# Patient Record
Sex: MALE | NOT HISPANIC OR LATINO | Employment: OTHER | URBAN - METROPOLITAN AREA
[De-identification: names, ages, dates, MRNs, and addresses within clinical notes are randomized per-mention and may not be internally consistent; named-entity substitution may affect disease eponyms.]

---

## 2018-01-17 ENCOUNTER — OFFICE VISIT CONVERTED (OUTPATIENT)
Dept: ORTHOPEDIC SURGERY | Facility: CLINIC | Age: 43
End: 2018-01-17
Attending: PHYSICIAN ASSISTANT

## 2018-03-14 ENCOUNTER — OFFICE VISIT CONVERTED (OUTPATIENT)
Dept: ORTHOPEDIC SURGERY | Facility: CLINIC | Age: 43
End: 2018-03-14
Attending: PHYSICIAN ASSISTANT

## 2018-04-18 ENCOUNTER — CONVERSION ENCOUNTER (OUTPATIENT)
Dept: ORTHOPEDIC SURGERY | Facility: CLINIC | Age: 43
End: 2018-04-18

## 2018-04-18 ENCOUNTER — OFFICE VISIT CONVERTED (OUTPATIENT)
Dept: ORTHOPEDIC SURGERY | Facility: CLINIC | Age: 43
End: 2018-04-18
Attending: PHYSICIAN ASSISTANT

## 2018-05-23 ENCOUNTER — CONVERSION ENCOUNTER (OUTPATIENT)
Dept: ORTHOPEDIC SURGERY | Facility: CLINIC | Age: 43
End: 2018-05-23

## 2018-05-23 ENCOUNTER — OFFICE VISIT CONVERTED (OUTPATIENT)
Dept: ORTHOPEDIC SURGERY | Facility: CLINIC | Age: 43
End: 2018-05-23
Attending: PHYSICIAN ASSISTANT

## 2019-03-11 ENCOUNTER — CONVERSION ENCOUNTER (OUTPATIENT)
Dept: ORTHOPEDIC SURGERY | Facility: CLINIC | Age: 44
End: 2019-03-11
Attending: ORTHOPAEDIC SURGERY

## 2019-03-11 ENCOUNTER — CONVERSION ENCOUNTER (OUTPATIENT)
Dept: ORTHOPEDIC SURGERY | Facility: CLINIC | Age: 44
End: 2019-03-11

## 2019-10-04 ENCOUNTER — CONVERSION ENCOUNTER (OUTPATIENT)
Dept: NEUROLOGY | Facility: CLINIC | Age: 44
End: 2019-10-04

## 2019-10-04 ENCOUNTER — OFFICE VISIT CONVERTED (OUTPATIENT)
Dept: NEUROLOGY | Facility: CLINIC | Age: 44
End: 2019-10-04
Attending: PSYCHIATRY & NEUROLOGY

## 2019-11-13 ENCOUNTER — APPOINTMENT (OUTPATIENT)
Dept: GENERAL RADIOLOGY | Age: 44
End: 2019-11-13
Attending: EMERGENCY MEDICINE
Payer: OTHER GOVERNMENT

## 2019-11-13 ENCOUNTER — HOSPITAL ENCOUNTER (EMERGENCY)
Age: 44
Discharge: HOME | End: 2019-11-14
Attending: EMERGENCY MEDICINE
Payer: OTHER GOVERNMENT

## 2019-11-13 DIAGNOSIS — J18.9 COMMUNITY ACQUIRED PNEUMONIA OF RIGHT MIDDLE LOBE OF LUNG: Primary | ICD-10-CM

## 2019-11-13 LAB
ANION GAP SERPL CALC-SCNC: 7 MMOL/L (ref 3–18)
BASOPHILS # BLD: 0.1 K/UL (ref 0–0.1)
BASOPHILS NFR BLD: 1 % (ref 0–2)
BUN SERPL-MCNC: 15 MG/DL (ref 7–18)
BUN/CREAT SERPL: 17 (ref 12–20)
CALCIUM SERPL-MCNC: 8.4 MG/DL (ref 8.5–10.1)
CHLORIDE SERPL-SCNC: 107 MMOL/L (ref 100–111)
CO2 SERPL-SCNC: 28 MMOL/L (ref 21–32)
CREAT SERPL-MCNC: 0.9 MG/DL (ref 0.6–1.3)
DIFFERENTIAL METHOD BLD: ABNORMAL
EOSINOPHIL # BLD: 1 K/UL (ref 0–0.4)
EOSINOPHIL NFR BLD: 8 % (ref 0–5)
ERYTHROCYTE [DISTWIDTH] IN BLOOD BY AUTOMATED COUNT: 12.1 % (ref 11.6–14.5)
FLUAV AG NPH QL IA: NEGATIVE
FLUBV AG NOSE QL IA: NEGATIVE
GLUCOSE SERPL-MCNC: 114 MG/DL (ref 74–99)
HCT VFR BLD AUTO: 38.3 % (ref 36–48)
HGB BLD-MCNC: 13.4 G/DL (ref 13–16)
LYMPHOCYTES # BLD: 3 K/UL (ref 0.9–3.6)
LYMPHOCYTES NFR BLD: 25 % (ref 21–52)
MCH RBC QN AUTO: 30.6 PG (ref 24–34)
MCHC RBC AUTO-ENTMCNC: 35 G/DL (ref 31–37)
MCV RBC AUTO: 87.4 FL (ref 74–97)
MONOCYTES # BLD: 1.2 K/UL (ref 0.05–1.2)
MONOCYTES NFR BLD: 10 % (ref 3–10)
NEUTS SEG # BLD: 6.6 K/UL (ref 1.8–8)
NEUTS SEG NFR BLD: 56 % (ref 40–73)
PLATELET # BLD AUTO: 323 K/UL (ref 135–420)
PMV BLD AUTO: 9.2 FL (ref 9.2–11.8)
POTASSIUM SERPL-SCNC: 3.6 MMOL/L (ref 3.5–5.5)
RBC # BLD AUTO: 4.38 M/UL (ref 4.7–5.5)
S PYO AG THROAT QL: NEGATIVE
SODIUM SERPL-SCNC: 142 MMOL/L (ref 136–145)
WBC # BLD AUTO: 11.8 K/UL (ref 4.6–13.2)

## 2019-11-13 PROCEDURE — 71046 X-RAY EXAM CHEST 2 VIEWS: CPT

## 2019-11-13 PROCEDURE — 87070 CULTURE OTHR SPECIMN AEROBIC: CPT

## 2019-11-13 PROCEDURE — 85025 COMPLETE CBC W/AUTO DIFF WBC: CPT

## 2019-11-13 PROCEDURE — 87804 INFLUENZA ASSAY W/OPTIC: CPT

## 2019-11-13 PROCEDURE — 74011250636 HC RX REV CODE- 250/636: Performed by: EMERGENCY MEDICINE

## 2019-11-13 PROCEDURE — 99284 EMERGENCY DEPT VISIT MOD MDM: CPT

## 2019-11-13 PROCEDURE — 80048 BASIC METABOLIC PNL TOTAL CA: CPT

## 2019-11-13 PROCEDURE — 74011250637 HC RX REV CODE- 250/637: Performed by: EMERGENCY MEDICINE

## 2019-11-13 PROCEDURE — 96374 THER/PROPH/DIAG INJ IV PUSH: CPT

## 2019-11-13 PROCEDURE — 93005 ELECTROCARDIOGRAM TRACING: CPT

## 2019-11-13 PROCEDURE — 87880 STREP A ASSAY W/OPTIC: CPT

## 2019-11-13 RX ORDER — DOXYCYCLINE 100 MG/1
100 CAPSULE ORAL
Status: COMPLETED | OUTPATIENT
Start: 2019-11-13 | End: 2019-11-13

## 2019-11-13 RX ADMIN — DOXYCYCLINE 100 MG: 100 CAPSULE ORAL at 23:32

## 2019-11-13 RX ADMIN — SODIUM CHLORIDE 1000 ML: 900 INJECTION, SOLUTION INTRAVENOUS at 23:28

## 2019-11-13 NOTE — LETTER
NOTIFICATION RETURN TO WORK / SCHOOL 
 
11/14/2019 1:24 AM 
 
Mr. Martha Neil [facility] To Whom It May Concern: 
 
Martha Neil is currently under the care of [facility] EMERGENCY DEPT. He will return to work/school on: 11/18/19 Martha Neil may return to work/school with the following restrictions: none If there are questions or concerns please have the patient contact our office.  
 
 
 
Sincerely, 
 
 
Bradley Plummer MD

## 2019-11-14 VITALS
WEIGHT: 170 LBS | TEMPERATURE: 98.2 F | RESPIRATION RATE: 16 BRPM | DIASTOLIC BLOOD PRESSURE: 65 MMHG | BODY MASS INDEX: 26.68 KG/M2 | HEIGHT: 67 IN | OXYGEN SATURATION: 98 % | SYSTOLIC BLOOD PRESSURE: 100 MMHG | HEART RATE: 66 BPM

## 2019-11-14 LAB
ATRIAL RATE: 69 BPM
CALCULATED P AXIS, ECG09: 32 DEGREES
CALCULATED R AXIS, ECG10: 86 DEGREES
CALCULATED T AXIS, ECG11: 33 DEGREES
DIAGNOSIS, 93000: NORMAL
P-R INTERVAL, ECG05: 202 MS
Q-T INTERVAL, ECG07: 372 MS
QRS DURATION, ECG06: 106 MS
QTC CALCULATION (BEZET), ECG08: 398 MS
VENTRICULAR RATE, ECG03: 69 BPM

## 2019-11-14 PROCEDURE — 74011250636 HC RX REV CODE- 250/636: Performed by: EMERGENCY MEDICINE

## 2019-11-14 RX ORDER — BENZONATATE 100 MG/1
100 CAPSULE ORAL
Qty: 30 CAP | Refills: 0 | Status: SHIPPED | OUTPATIENT
Start: 2019-11-14 | End: 2019-11-21

## 2019-11-14 RX ORDER — DOXYCYCLINE 100 MG/1
100 CAPSULE ORAL 2 TIMES DAILY
Qty: 20 CAP | Refills: 0 | Status: SHIPPED | OUTPATIENT
Start: 2019-11-14 | End: 2019-11-24

## 2019-11-14 RX ORDER — CEFTRIAXONE 1 G/1
1 INJECTION, POWDER, FOR SOLUTION INTRAMUSCULAR; INTRAVENOUS ONCE
Status: COMPLETED | OUTPATIENT
Start: 2019-11-14 | End: 2019-11-14

## 2019-11-14 RX ORDER — CODEINE PHOSPHATE AND GUAIFENESIN 10; 100 MG/5ML; MG/5ML
5 SOLUTION ORAL
Qty: 120 ML | Refills: 0 | Status: SHIPPED | OUTPATIENT
Start: 2019-11-14 | End: 2019-11-17

## 2019-11-14 RX ADMIN — CEFTRIAXONE 1 G: 1 INJECTION, POWDER, FOR SOLUTION INTRAMUSCULAR; INTRAVENOUS at 01:32

## 2019-11-14 NOTE — ED NOTES
Aao. Respirations unlabored and even. Given prescriptions with verbal medication information. Given verbal and written d/c instructions.  Pt ambulated from ED in NAD

## 2019-11-14 NOTE — DISCHARGE INSTRUCTIONS
Pneumonia: Care Instructions  Your Care Instructions    Pneumonia is an infection of the lungs. Most cases are caused by infections from bacteria or viruses. Pneumonia may be mild or very severe. If it is caused by bacteria, you will be treated with antibiotics. It may take a few weeks to a few months to recover fully from pneumonia, depending on how sick you were and whether your overall health is good. Follow-up care is a key part of your treatment and safety. Be sure to make and go to all appointments, and call your doctor if you are having problems. It's also a good idea to know your test results and keep a list of the medicines you take. How can you care for yourself at home? · Take your antibiotics exactly as directed. Do not stop taking the medicine just because you are feeling better. You need to take the full course of antibiotics. · Take your medicines exactly as prescribed. Call your doctor if you think you are having a problem with your medicine. · Get plenty of rest and sleep. You may feel weak and tired for a while, but your energy level will improve with time. · To prevent dehydration, drink plenty of fluids, enough so that your urine is light yellow or clear like water. Choose water and other caffeine-free clear liquids until you feel better. If you have kidney, heart, or liver disease and have to limit fluids, talk with your doctor before you increase the amount of fluids you drink. · Take care of your cough so you can rest. A cough that brings up mucus from your lungs is common with pneumonia. It is one way your body gets rid of the infection. But if coughing keeps you from resting or causes severe fatigue and chest-wall pain, talk to your doctor. He or she may suggest that you take a medicine to reduce the cough. · Use a vaporizer or humidifier to add moisture to your bedroom. Follow the directions for cleaning the machine. · Do not smoke or allow others to smoke around you.  Smoke will make your cough last longer. If you need help quitting, talk to your doctor about stop-smoking programs and medicines. These can increase your chances of quitting for good. · Take an over-the-counter pain medicine, such as acetaminophen (Tylenol), ibuprofen (Advil, Motrin), or naproxen (Aleve). Read and follow all instructions on the label. · Do not take two or more pain medicines at the same time unless the doctor told you to. Many pain medicines have acetaminophen, which is Tylenol. Too much acetaminophen (Tylenol) can be harmful. · If you were given a spirometer to measure how well your lungs are working, use it as instructed. This can help your doctor tell how your recovery is going. · To prevent pneumonia in the future, talk to your doctor about getting a flu vaccine (once a year) and a pneumococcal vaccine (one time only for most people). When should you call for help? Call 911 anytime you think you may need emergency care. For example, call if:    · You have severe trouble breathing.    Call your doctor now or seek immediate medical care if:    · You cough up dark brown or bloody mucus (sputum).     · You have new or worse trouble breathing.     · You are dizzy or lightheaded, or you feel like you may faint.    Watch closely for changes in your health, and be sure to contact your doctor if:    · You have a new or higher fever.     · You are coughing more deeply or more often.     · You are not getting better after 2 days (48 hours).     · You do not get better as expected. Where can you learn more? Go to http://brian-dion.info/. Enter [id number] in the search box to learn more about \"Pneumonia: Care Instructions. \"  Current as of: June 9, 2019  Content Version: 12.2  © 6862-9519 [employer]. Care instructions adapted under license by [employer] (which disclaims liability or warranty for this information).  If you have questions about a medical condition or this instruction, always ask your healthcare professional. [place] [address] any warranty or liability for your use of this information.

## 2019-11-14 NOTE — ED NOTES
Asked Dr Jaylan Gee if he wanted blood cultures on this pt prior to getting medication. He stated he did not want blood cultures.

## 2019-11-14 NOTE — ED PROVIDER NOTES
EMERGENCY DEPARTMENT HISTORY AND PHYSICAL EXAM    Date: 11/13/2019  Patient Name: Aleida Quinones    History of Presenting Illness     Chief Complaint   Patient presents with    Cough    Generalized Body Aches    Dizziness         History Provided By: Patient    History:   10:37 PM  Aleida Quinones is a 45 y.o. male who presents to the emergency department with complaint of a chronic cough, onset 3 weeks ago. He reports that the cough is occasionally productive with blood streaked sputum. Associated sxs include generalized body aches, decreased appetite, dizziness, nausea, and vomiting. States his symptoms began a few days after receiving the flu shot. He has been taking Robitussin and Nyquil for relief. He has a history of tuberculosis. No history of hospitalization due to respiratory issues. He notes multiple sick contact at work. Patient denies unexpected weight loss or any other sxs or complaints. PCP: Daryle Elder, NP        Past History     Past Medical History:  Past Medical History:   Diagnosis Date    Kidney stone        Past Surgical History:  Past Surgical History:   Procedure Laterality Date    HX ORTHOPAEDIC         Family History:  History reviewed. No pertinent family history. Social History:  Social History     Tobacco Use    Smoking status: Never Smoker    Smokeless tobacco: Never Used   Substance Use Topics    Alcohol use: Yes     Comment: occasionally    Drug use: Never       Allergies:  No Known Allergies      Review of Systems   Review of Systems   Constitutional: Positive for appetite change. Negative for chills, diaphoresis, fever and unexpected weight change. HENT: Negative for congestion, drooling, ear pain, rhinorrhea, sore throat, tinnitus and trouble swallowing. Eyes: Negative for photophobia, pain, redness and visual disturbance. Respiratory: Positive for cough. Negative for choking, chest tightness, shortness of breath, wheezing and stridor.     Cardiovascular: Negative for chest pain, palpitations and leg swelling. Gastrointestinal: Positive for nausea and vomiting. Negative for abdominal distention, abdominal pain, anal bleeding, blood in stool, constipation and diarrhea. Genitourinary: Negative for difficulty urinating, dysuria, flank pain, frequency, hematuria and urgency. Musculoskeletal: Positive for myalgias. Negative for arthralgias, back pain and neck pain. Skin: Negative for color change, rash and wound. Neurological: Positive for dizziness. Negative for seizures, syncope, speech difficulty, light-headedness and headaches. Hematological: Does not bruise/bleed easily. Psychiatric/Behavioral: Negative for agitation, behavioral problems, hallucinations, self-injury and suicidal ideas. The patient is not hyperactive. Physical Exam     Vitals:    11/13/19 2213 11/13/19 2258   BP: 132/87    Pulse: 80    Resp: 16    Temp: 97.9 °F (36.6 °C)    SpO2: 100% 100%   Weight: 77.1 kg (170 lb)    Height: 5' 7\" (1.702 m)      Physical Exam   Constitutional: He is oriented to person, place, and time. He appears well-developed and well-nourished. No distress. Well appearing, non-toxic   HENT:   Head: Normocephalic and atraumatic. Right Ear: External ear normal.   Left Ear: External ear normal.   Mouth/Throat: Oropharynx is clear and moist. No oropharyngeal exudate. Eyes: Pupils are equal, round, and reactive to light. Conjunctivae and EOM are normal. No scleral icterus. No pallor   Neck: Normal range of motion. Neck supple. No JVD present. No tracheal deviation present. No thyromegaly present. Cardiovascular: Normal rate, regular rhythm and normal heart sounds. Pulmonary/Chest: Effort normal and breath sounds normal. No stridor. No respiratory distress. Persistent audible dry cough   Abdominal: Soft. Bowel sounds are normal. He exhibits no distension. There is no tenderness. There is no rebound and no guarding.    Musculoskeletal: Normal range of motion. He exhibits no edema or tenderness. No soft tissue injuries   Lymphadenopathy:     He has no cervical adenopathy. Neurological: He is alert and oriented to person, place, and time. He has normal reflexes. No cranial nerve deficit. Coordination normal.   Skin: Skin is warm and dry. No rash noted. He is not diaphoretic. No erythema. Psychiatric: He has a normal mood and affect. His behavior is normal. Judgment and thought content normal.   Nursing note and vitals reviewed. Diagnostic Study Results     Labs -     Recent Results (from the past 12 hour(s))   EKG, 12 LEAD, INITIAL    Collection Time: 11/13/19 10:35 PM   Result Value Ref Range    Ventricular Rate 69 BPM    Atrial Rate 69 BPM    P-R Interval 202 ms    QRS Duration 106 ms    Q-T Interval 372 ms    QTC Calculation (Bezet) 398 ms    Calculated P Axis 32 degrees    Calculated R Axis 86 degrees    Calculated T Axis 33 degrees    Diagnosis       Normal sinus rhythm  Nonspecific T wave abnormality  Abnormal ECG  No previous ECGs available     INFLUENZA A & B AG (RAPID TEST)    Collection Time: 11/13/19 10:40 PM   Result Value Ref Range    Influenza A Antigen NEGATIVE  NEG      Influenza B Antigen NEGATIVE  NEG     POC GROUP A STREP    Collection Time: 11/13/19 10:50 PM   Result Value Ref Range    Group A strep (POC) NEGATIVE  NEG     CBC WITH AUTOMATED DIFF    Collection Time: 11/13/19 11:25 PM   Result Value Ref Range    WBC 11.8 4.6 - 13.2 K/uL    RBC 4.38 (L) 4.70 - 5.50 M/uL    HGB 13.4 13.0 - 16.0 g/dL    HCT 38.3 36.0 - 48.0 %    MCV 87.4 74.0 - 97.0 FL    MCH 30.6 24.0 - 34.0 PG    MCHC 35.0 31.0 - 37.0 g/dL    RDW 12.1 11.6 - 14.5 %    PLATELET 657 709 - 584 K/uL    MPV 9.2 9.2 - 11.8 FL    NEUTROPHILS 56 40 - 73 %    LYMPHOCYTES 25 21 - 52 %    MONOCYTES 10 3 - 10 %    EOSINOPHILS 8 (H) 0 - 5 %    BASOPHILS 1 0 - 2 %    ABS. NEUTROPHILS 6.6 1.8 - 8.0 K/UL    ABS. LYMPHOCYTES 3.0 0.9 - 3.6 K/UL    ABS. MONOCYTES 1.2 0.05 - 1.2 K/UL    ABS. EOSINOPHILS 1.0 (H) 0.0 - 0.4 K/UL    ABS. BASOPHILS 0.1 0.0 - 0.1 K/UL    DF AUTOMATED     METABOLIC PANEL, BASIC    Collection Time: 11/13/19 11:25 PM   Result Value Ref Range    Sodium 142 136 - 145 mmol/L    Potassium 3.6 3.5 - 5.5 mmol/L    Chloride 107 100 - 111 mmol/L    CO2 28 21 - 32 mmol/L    Anion gap 7 3.0 - 18 mmol/L    Glucose 114 (H) 74 - 99 mg/dL    BUN 15 7.0 - 18 MG/DL    Creatinine 0.90 0.6 - 1.3 MG/DL    BUN/Creatinine ratio 17 12 - 20      GFR est AA >60 >60 ml/min/1.73m2    GFR est non-AA >60 >60 ml/min/1.73m2    Calcium 8.4 (L) 8.5 - 10.1 MG/DL       Radiologic Studies -    XR CHEST PA LAT   Final Result   Impression:   --------------      No active cardiopulmonary disease. 12:06 AM  RADIOLOGY FINDINGS  Chest X-ray shows infiltrate to the border of the right middle lobe  Pending review by Radiologist  Recorded by JARVIS Kruse, as dictated by Rika Meléndez. Kevon Batres MD    Medical Decision Making   I am the first provider for this patient. I reviewed the vital signs, available nursing notes, past medical history, past surgical history, family history and social history. Vital Signs-Reviewed the patient's vital signs. Pulse Oximetry Analysis - 100% on room air     Records Reviewed: Nursing Notes and Old Medical Records    Provider Notes (Medical Decision Making):   Ddx: Bronchitis, PNA, possible but unlikely tuberculosis    Procedures:  Procedures    MEDICATIONS GIVEN:  Medications   sodium chloride 0.9 % bolus infusion 1,000 mL (1,000 mL IntraVENous New Bag 11/13/19 8601)   cefTRIAXone (ROCEPHIN) injection 1 g (has no administration in time range)   doxycycline (MONODOX) capsule 100 mg (100 mg Oral Given 11/13/19 2332)       ED Course:   10:37 PM   Initial assessment performed. The patients presenting problems have been discussed, and they are in agreement with the care plan formulated and outlined with them.   I have encouraged them to ask questions as they arise throughout their visit. Diagnosis and Disposition     DISCHARGE NOTE:  12:22 AM  Matt Donahue's  results have been reviewed with him. He has been counseled regarding his diagnosis, treatment, and plan. He verbally conveys understanding and agreement of the signs, symptoms, diagnosis, treatment and prognosis and additionally agrees to follow up as discussed. He also agrees with the care-plan and conveys that all of his questions have been answered. I have also provided discharge instructions for him that include: educational information regarding their diagnosis and treatment, and list of reasons why they would want to return to the ED prior to their follow-up appointment, should his condition change. He has been provided with education for proper emergency department utilization. CLINICAL IMPRESSION:    1. Community acquired pneumonia of right middle lobe of lung ([facility] [address]        PLAN:  1. D/C Home  2. Current Discharge Medication List      START taking these medications    Details   benzonatate (TESSALON PERLES) 100 mg capsule Take 1 Cap by mouth three (3) times daily as needed for Cough for up to 7 days. Qty: 30 Cap, Refills: 0      guaiFENesin-codeine (CHERATUSSIN AC) 100-10 mg/5 mL solution Take 5 mL by mouth three (3) times daily as needed for Cough for up to 3 days. Max Daily Amount: 15 mL. Qty: 120 mL, Refills: 0    Associated Diagnoses: Community acquired pneumonia of right middle lobe of lung ([facility] [address]      doxycycline (MONODOX) 100 mg capsule Take 1 Cap by mouth two (2) times a day for 10 days. Qty: 20 Cap, Refills: 0      guaiFENesin-dextromethorphan SR (MUCINEX DM) 600-30 mg per tablet Take 1 Tab by mouth every twelve (12) hours as needed for Cough. Qty: 30 Tab, Refills: 1           3.    Follow-up Information     Follow up With Specialties Details Why Contact Info    Your PCP  Schedule an appointment as soon as possible for a visit in 2 days For primary care follow up     [facility] EMERGENCY DEPT Emergency Medicine  As needed, If symptoms worsen [address] [place] [address]  [phone]          _______________________________    This note was prepared by Venkatesh Purdy, acting as Scribe for Alistair. Leticia Calvo MD.    Alistair. Leticia Calvo MD:  The scribe's documentation has been prepared under my direction and personally reviewed by me in its entirety. I confirm that the note above accurately reflects all work, treatment, procedures, and medical decision making performed by me.

## 2019-11-14 NOTE — ED TRIAGE NOTES
Pt ambulatory to ED for persistent cough x 3 weeks, decreased appetite, vomiting (with blood at times), and sore throat. States received flu shot 3 weeks ago, been sick since. Pt A&O x 4, able to speak, complete sentences to make needs known.

## 2019-11-16 LAB
BACTERIA SPEC CULT: NORMAL
BACTERIA SPEC CULT: NORMAL
SERVICE CMNT-IMP: NORMAL

## 2020-04-15 ENCOUNTER — TELEPHONE CONVERTED (OUTPATIENT)
Dept: ORTHOPEDIC SURGERY | Facility: CLINIC | Age: 45
End: 2020-04-15
Attending: ORTHOPAEDIC SURGERY

## 2020-04-15 ENCOUNTER — CONVERSION ENCOUNTER (OUTPATIENT)
Dept: ORTHOPEDIC SURGERY | Facility: CLINIC | Age: 45
End: 2020-04-15

## 2020-05-15 ENCOUNTER — OFFICE VISIT CONVERTED (OUTPATIENT)
Dept: ORTHOPEDIC SURGERY | Facility: CLINIC | Age: 45
End: 2020-05-15
Attending: ORTHOPAEDIC SURGERY

## 2020-06-03 ENCOUNTER — OFFICE VISIT CONVERTED (OUTPATIENT)
Dept: ORTHOPEDIC SURGERY | Facility: CLINIC | Age: 45
End: 2020-06-03
Attending: PHYSICIAN ASSISTANT

## 2020-06-03 ENCOUNTER — CONVERSION ENCOUNTER (OUTPATIENT)
Dept: ORTHOPEDIC SURGERY | Facility: CLINIC | Age: 45
End: 2020-06-03

## 2020-07-10 ENCOUNTER — OFFICE VISIT CONVERTED (OUTPATIENT)
Dept: ORTHOPEDIC SURGERY | Facility: CLINIC | Age: 45
End: 2020-07-10
Attending: ORTHOPAEDIC SURGERY

## 2020-08-20 ENCOUNTER — HOSPITAL ENCOUNTER (OUTPATIENT)
Dept: MRI IMAGING | Facility: HOSPITAL | Age: 45
Discharge: HOME | End: 2020-08-20
Attending: ORTHOPAEDIC SURGERY

## 2020-09-14 ENCOUNTER — OFFICE VISIT CONVERTED (OUTPATIENT)
Dept: ORTHOPEDIC SURGERY | Facility: CLINIC | Age: 45
End: 2020-09-14
Attending: ORTHOPAEDIC SURGERY

## 2020-10-07 ENCOUNTER — CONVERSION ENCOUNTER (OUTPATIENT)
Dept: NEUROLOGY | Facility: CLINIC | Age: 45
End: 2020-10-07

## 2020-10-07 ENCOUNTER — OFFICE VISIT CONVERTED (OUTPATIENT)
Dept: NEUROLOGY | Facility: CLINIC | Age: 45
End: 2020-10-07
Attending: PSYCHIATRY & NEUROLOGY

## 2021-01-13 ENCOUNTER — OFFICE VISIT CONVERTED (OUTPATIENT)
Dept: ORTHOPEDIC SURGERY | Facility: CLINIC | Age: 46
End: 2021-01-13
Attending: PHYSICIAN ASSISTANT

## 2021-02-03 ENCOUNTER — OFFICE VISIT CONVERTED (OUTPATIENT)
Dept: ORTHOPEDIC SURGERY | Facility: CLINIC | Age: 46
End: 2021-02-03
Attending: PHYSICIAN ASSISTANT

## 2021-02-25 ENCOUNTER — OFFICE VISIT CONVERTED (OUTPATIENT)
Dept: NEUROLOGY | Facility: CLINIC | Age: 46
End: 2021-02-25
Attending: PSYCHIATRY & NEUROLOGY

## 2021-03-19 ENCOUNTER — OFFICE VISIT CONVERTED (OUTPATIENT)
Dept: NEUROLOGY | Facility: CLINIC | Age: 46
End: 2021-03-19
Attending: NURSE PRACTITIONER

## 2021-03-19 ENCOUNTER — CONVERSION ENCOUNTER (OUTPATIENT)
Dept: NEUROLOGY | Facility: CLINIC | Age: 46
End: 2021-03-19

## 2021-04-28 ENCOUNTER — HOSPITAL ENCOUNTER (OUTPATIENT)
Dept: MRI IMAGING | Facility: HOSPITAL | Age: 46
Discharge: HOME | End: 2021-04-28
Attending: ORTHOPAEDIC SURGERY

## 2021-05-10 NOTE — H&P
History and Physical      Patient Name: Sebastian Foley   Patient ID: [id number]   Sex: Male   YOB: 1975    Primary Care Provider: Aliya Ford MD   Referring Provider: Aliya Ford MD    Visit Date: July 10, 2020    Provider: Juan David Clark MD   Location: [facility]   Location Address: [address] [place] [address]  [id number]   Location Phone: [phone]          Chief Complaint  · Follow up Right Knee Pain.      History Of Present Illness  Sebastian Foley is a 44 year old  male who is following up for right knee pain. He has had a left knee scope in the past x2. I did the second one about 2 years ago. He has had some improvement from that, but he says he still has some pain in that knee, but he is here for his right knee today. He is complaining about pain mostly over the medial side of his knee. He points to the patellar tendon as the source of his pain. He says he has some locking and catching of the knee, but no giving-way. Prior X-rays show some marginal osteophyte of medial compartment, but no significant joint space narrowing.       Past Medical History  Arthritis; Chronic migraine without aura; HTN (hypertension); Hyperlipidemia; Hypertension; Hypertension, Benign Essential; Leg pain; Limb Swelling; Migraines; Positive PPD; Precordial chest pain; Renal Calculus; Right-sided Ureterolithiasis         Past Surgical History  Cystoscopy; Kidney Stone Surgery, Unspecified; Knee surgery         Medication List  Aimovig Autoinjector 70 mg/mL subcutaneous auto-injector; CLA 1,000 mg oral capsule; Fish Oil oral; Imitrex 100 mg oral tablet; Multi Vitamin 9 mg iron/15 mL oral liquid         Allergy List  NO KNOWN DRUG ALLERGIES         Family Medical History  Heart Disease; Diabetes, unspecified type; Hypertension; Family history of Arthritis; Family history of heart disease; Family history of diabetes mellitus         Social History  Alcohol (Current some day); [employer] service; lives  "with children; lives with spouse; Recreational Drug Use (Never); Tobacco (Former); Working         Review of Systems  · Constitutional  o Denies  o : fever, chills, weight loss  · Cardiovascular  o Denies  o : chest pain, shortness of breath  · Gastrointestinal  o Denies  o : liver disease, heartburn, nausea, blood in stools  · Genitourinary  o Denies  o : painful urination, blood in urine  · Integument  o Denies  o : rash, itching  · Neurologic  o Denies  o : headache, weakness, loss of consciousness  · Musculoskeletal  o Denies  o : painful, swollen joints  · Psychiatric  o Denies  o : drug/alcohol addiction, anxiety, depression      Vitals  Date Time BP Position Site L\R Cuff Size HR RR TEMP (F) WT  HT  BMI kg/m2 BSA m2 O2 Sat        07/10/2020 02:38 PM      65 - R   176lbs 16oz 5'  7\" 27.72 1.95 97 %          Physical Examination  · Constitutional  o Appearance  o : well developed, well-nourished, no obvious deformities present  · Head and Face  o Head  o :   § Inspection  § : normocephalic  o Face  o :   § Inspection  § : no facial lesions  · Eyes  o Conjunctivae  o : conjunctivae normal  o Sclerae  o : sclerae white  · Ears, Nose, Mouth and Throat  o Ears  o :   § External Ears  § : appearance within normal limits  § Hearing  § : intact  o Nose  o :   § External Nose  § : appearance normal  · Neck  o Inspection/Palpation  o : normal appearance  o Range of Motion  o : full range of motion  · Respiratory  o Respiratory Effort  o : breathing unlabored  o Inspection of Chest  o : normal appearance  o Auscultation of Lungs  o : no audible wheezing or rales  · Cardiovascular  o Heart  o : regular rate  · Gastrointestinal  o Abdominal Examination  o : soft and non-tender  · Skin and Subcutaneous Tissue  o General Inspection  o : intact, no rashes  · Psychiatric  o General  o : Alert and oriented x3  o Judgement and Insight  o : judgment and insight intact  o Mood and Affect  o : mood normal, affect " appropriate  · Right Knee  o Inspection  o : No significant swelling, atrophy, or skin discoloration. Full range of motion. Tenderness of patellar tendon. Positive pain with Hugo's and Apley's. Tenderness of medial joint line. Nontender lateral joint line. Negative Lachman. Negative Posterior Sag. Stable to varus/valgus stress. Good strength of quadriceps, hamstrings, dorsiflexors, and plantar flexors. Neurovascularly intact. Sensation grossly intact. Calf supple; no signs of DVT.           Assessment  · Right knee pain, unspecified chronicity     719.46/M25.561  possible medial meniscus tear      Plan  · Medications  o Medications have been Reconciled  o Transition of Care or Provider Policy  · Instructions  o Reviewed the patient's Past Medical, Social, and Family history as well as the ROS at today's visit, no changes.  o Call or return if worsening symptoms.  o Reviewed Xrays.  o Follow up after MRI.  o This note was transcribed by Olive good.  · Referrals  o ID: [id number] Date: 07/10/2020 Type: Inbound  Specialty: Orthopedic Surgery            Electronically Signed by: Olive Sotomayor-, Other -Author on July 13, 2020 07:32:37 PM  Electronically Co-signed by: Juan David Clark MD -Reviewer on July 15, 2020 08:16:40 AM

## 2021-05-12 NOTE — PROGRESS NOTES
"   Quick Note      Patient Name: Sebastian Foley   Patient ID: [id number]   Sex: Male   YOB: 1975    Primary Care Provider: Aliya Ford MD   Referring Provider: Aliya Ford MD    Visit Date: April 15, 2020    Provider: Juan David Clark MD   Location: [facility]   Location Address: [address] [place] [address]  [id number]   Location Phone: [phone]          History Of Present Illness  TELEHEALTH VISIT  Chief Complaint: Left knee pain   Sebastian Foley is a 44 year old  male who is presenting for evaluation via telehealth visit. Verbal consent obtained before beginning visit.   Provider spent 5 minutes with the patient during telehealth visit.   The following staff were present during this visit: Juan David Clark MD   Past Medical History/Overview of Patient Symptoms     I spoke with Mr. Foley via telehealth today. He is complaining of some pain in his left knee that has been off and on over the last couple years. He had a knee scope by me back in March 2018. He denies any other complaints besides pain in his left knee. It bothers him when he goes up and down stairs mostly in the lateral side of his knee.     Left knee pain     Plan to schedule him for an appointment. He needs to be evaluated with standing x-rays and physical exam. Going to call for a follow-up appointment.            Vitals  Date Time BP Position Site L\R Cuff Size HR RR TEMP (F) WT  HT  BMI kg/m2 BSA m2 O2 Sat HC       04/15/2020 08:37 AM         174lbs 16oz 5'  7\" 27.41 1.94               Plan  · Medications  o Medications have been Reconciled  o Transition of Care or Provider Policy  · Instructions  o Plan Of Care:             Electronically Signed by: Jayashree Adair, -Author on April 17, 2020 10:17:05 AM  Electronically Co-signed by: Juan David Clark MD -Reviewer on April 17, 2020 05:31:45 PM  "

## 2021-05-13 NOTE — PROGRESS NOTES
Progress Note      Patient Name: Sebastian Foley   Patient ID: [id number]   Sex: Male   YOB: 1975    Primary Care Provider: Aliya Ford MD   Referring Provider: Aliya Ford MD    Visit Date: Soraya 3, 2020    Provider: Kylie Pelletier PA-C   Location: [facility]   Location Address: [address] [place] [address]  [id number]   Location Phone: [phone]          Chief Complaint  · Left knee pain      History Of Present Illness  Sebastian Foley is a 44 year old  male who presents today to [facility].      Patient is  following up for left knee pain. Patient states pain has been waxing and waning for the last couple of years. Patient states a history of arthroscopic surgery performed in March 2018. Patient denies any recent injury or trauma. Patient states pain when going up and down the stairs. Patient is here to receive Synvisc injection.       Past Medical History  Arthritis; Chronic migraine without aura; HTN (hypertension); Hyperlipidemia; Hypertension; Hypertension, Benign Essential; Leg pain; Limb Swelling; Migraines; Positive PPD; Precordial chest pain; Renal Calculus; Right-sided ureterolithiasis         Past Surgical History  Cystoscopy; Kidney Stone Surgery, Unspecified; Knee surgery         Medication List  Aimovig Autoinjector 70 mg/mL subcutaneous auto-injector; CLA 1,000 mg oral capsule; Fish Oil oral; Imitrex 100 mg oral tablet; Multi Vitamin 9 mg iron/15 mL oral liquid         Allergy List  NO KNOWN DRUG ALLERGIES         Family Medical History  Heart Disease; Diabetes, unspecified type; Hypertension; Family history of Arthritis; Family history of heart disease; Family history of diabetes mellitus         Social History  Alcohol (Current some day); Army service; 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; lives with spouse; Recreational Drug Use (Never); Tobacco (Former); Working         Review of Systems  · Constitutional  o Denies  o : fever, chills, weight  "loss  · Cardiovascular  o Denies  o : chest pain, shortness of breath  · Gastrointestinal  o Denies  o : liver disease, heartburn, nausea, blood in stools  · Genitourinary  o Denies  o : painful urination, blood in urine  · Integument  o Denies  o : rash, itching  · Neurologic  o Denies  o : headache, weakness, loss of consciousness  · Musculoskeletal  o Denies  o : painful, swollen joints  · Psychiatric  o Denies  o : drug/alcohol addiction, anxiety, depression      Vitals  Date Time BP Position Site L\R Cuff Size HR RR TEMP (F) WT  HT  BMI kg/m2 BSA m2 O2 Sat        06/03/2020 03:01 PM         172lbs 0oz 5'  7\" 26.94 1.92           Physical Examination  · Constitutional  o Appearance  o : well developed, well-nourished, no obvious deformities present  · Head and Face  o Head  o :   § Inspection  § : normocephalic  o Face  o :   § Inspection  § : no facial lesions  · Eyes  o Conjunctivae  o : conjunctivae normal  o Sclerae  o : sclerae white  · Ears, Nose, Mouth and Throat  o Ears  o :   § External Ears  § : appearance within normal limits  § Hearing  § : intact  o Nose  o :   § External Nose  § : appearance normal  · Neck  o Inspection/Palpation  o : normal appearance  o Range of Motion  o : full range of motion  · Respiratory  o Respiratory Effort  o : breathing unlabored  o Inspection of Chest  o : normal appearance  o Auscultation of Lungs  o : no audible wheezing or rales  · Cardiovascular  o Heart  o : regular rate  · Gastrointestinal  o Abdominal Examination  o : soft and non-tender  · Skin and Subcutaneous Tissue  o General Inspection  o : intact, no rashes  · Psychiatric  o General  o : Alert and oriented x3  o Judgement and Insight  o : judgment and insight intact  o Mood and Affect  o : mood normal, affect appropriate  · Injection Note/Aspiration Note  o Site  o : left knee  o Procedure  o : Procedure: After educating the patient, patient gave consent for procedure. After using Chloraprep, the joint " space was injected. The patient tolerated the procedure well.  o Medication  o : Synvisc One 48 mg  · Left Knee-Street  o Inspection  o : no limping gait, weight bearing, no swelling, no ecchymosis, no atrophy, neutral alignment  o Palpation  o : tenderness at medial joint line, tenderness at lateral joint line, no patellar tendon tenderness, no pain of MCL, no pain at LCL  o ROM  o : full extension, full flexion  o Strength  o : full extension, full flexion  o Special Tests  o : negative varus stress, negaitve valgus stress  o Neurovascular  o : Full sensation, Dorsal Pedal Pulse 2+, posteriror tibialis pulse 2+          Assessment  · Primary osteoarthritis of left knee     715.16/M17.12  · Left knee pain, unspecified chronicity     719.46/M25.562      Plan  · Orders  o Hyaluronan or derivative, Synvisc or Synvisc-One, for intra-didier () - 715.16/M17.12 - 06/03/2020   Lot [id number] Exp 08 2022 [employer] Administered by BJ ESTRELLA PA-C  o Knee Intra-articular Injection without US [facility] (55063) - 715.16/M17.12 - 06/03/2020   Administered by BJ ESTRELLA PA-C  · Medications  o Medications have been Reconciled  o Transition of Care or Provider Policy  · Instructions  o Reviewed the patient's Past Medical, Social, and Family history as well as the ROS at today's visit, no changes.  o Call or return if worsening symptoms.  o Follow Up PRN.  o Electronically Identified Patient Education Materials Provided Electronically  · Referrals  o ID: [id number] Date: 04/13/2020 Type: Inbound  Specialty: Orthopedic Surgery            Electronically Signed by: Bj Estrella PA-C -Author on Soraya 3, 2020 03:28:40 PM

## 2021-05-13 NOTE — PROGRESS NOTES
Progress Note      Patient Name: Sebastian Foley   Patient ID: [id number]   Sex: Male   YOB: 1975    Primary Care Provider: Aliya Ford MD   Referring Provider: Aliya Ford MD    Visit Date: May 15, 2020    Provider: Juan David Clark MD   Location: [facility]   Location Address: [address] [place] [address]  [id number]   Location Phone: [phone]          Chief Complaint  · Followup left knee pain.      History Of Present Illness  Sebastian Foley is a 44 year old  male who is following up for left knee pain. Patient states pain has been waxing and waning for the last couple of years. Patient states a history of arthroscopic surgery performed in March 2018. Patient denies any recent injury or trauma. Patient states pain when going up and down the stairs.       Past Medical History  Arthritis; Chronic migraine without aura; HTN (hypertension); Hyperlipidemia; Hypertension; Hypertension, Benign Essential; Leg pain; Limb Swelling; Migraines; Positive PPD; Precordial chest pain; Renal Calculus; Right-sided ureterolithiasis         Past Surgical History  Cystoscopy; Kidney Stone Surgery, Unspecified; Knee surgery         Medication List  Aimovig Autoinjector 70 mg/mL subcutaneous auto-injector; CLA 1,000 mg oral capsule; Fish Oil oral; Imitrex 100 mg oral tablet; Multi Vitamin 9 mg iron/15 mL oral liquid         Allergy List  NO KNOWN DRUG ALLERGIES         Family Medical History  Heart Disease; Diabetes, unspecified type; Hypertension; Family history of Arthritis; Family history of heart disease; Family history of diabetes mellitus         Social History  Alcohol (Current some day); Army service; 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; lives with spouse; Recreational Drug Use (Never); Tobacco (Former); Working         Review of Systems  · Constitutional  o Denies  o : fever, chills, weight loss  · Cardiovascular  o Denies  o : chest pain, shortness of breath  · Gastrointestinal  o Denies  o : liver  "disease, heartburn, nausea, blood in stools  · Genitourinary  o Denies  o : painful urination, blood in urine  · Integument  o Denies  o : rash, itching  · Neurologic  o Denies  o : headache, weakness, loss of consciousness  · Musculoskeletal  o Admits  o : painful, swollen joints  · Psychiatric  o Denies  o : drug/alcohol addiction, anxiety, depression      Vitals  Date Time BP Position Site L\R Cuff Size HR RR TEMP (F) WT  HT  BMI kg/m2 BSA m2 O2 Sat        05/15/2020 03:36 PM      96 - R   172lbs 0oz 5'  7\" 26.94 1.92 98 %          Physical Examination  · Constitutional  o Appearance  o : well developed, well-nourished, no obvious deformities present  · Head and Face  o Head  o :   § Inspection  § : normocephalic  o Face  o :   § Inspection  § : no facial lesions  · Eyes  o Conjunctivae  o : conjunctivae normal  o Sclerae  o : sclerae white  · Ears, Nose, Mouth and Throat  o Ears  o :   § External Ears  § : appearance within normal limits  § Hearing  § : intact  o Nose  o :   § External Nose  § : appearance normal  · Neck  o Inspection/Palpation  o : normal appearance  o Range of Motion  o : full range of motion  · Respiratory  o Respiratory Effort  o : breathing unlabored  o Inspection of Chest  o : normal appearance  o Auscultation of Lungs  o : no audible wheezing or rales  · Cardiovascular  o Heart  o : regular rate  · Gastrointestinal  o Abdominal Examination  o : soft and non-tender  · Skin and Subcutaneous Tissue  o General Inspection  o : intact, no rashes  · Psychiatric  o General  o : Alert and oriented x3  o Judgement and Insight  o : judgment and insight intact  o Mood and Affect  o : mood normal, affect appropriate  · Left Knee  o Inspection  o : No skin discoloration, atrophy, or swelling. Mild tenderness of medial and lateral joint line. Full extension. Full flexion. Positive crepitus. Calf supple and nontender. Neurovascularly grossly intact. Sensation grossly intact. Dorsal pedal and posterior " tibialis pulses are 2+.  · In Office Procedures  o View  o : LAT/SUNRISE/STANDING   o Site  o : left knee  o Indication  o : Left knee pain   o Study  o : X-rays ordered, taken in the office, and reviewed today.  o Xray  o : Mild-to-moderate osteoarthritis and chondromalacia.  o Comparative Data  o : Comparative Data found and reviewed today               Assessment  · Primary osteoarthritis of left knee     715.16/M17.12  · Left knee pain, unspecified chronicity     719.46/M25.562  · History of arthroscopic left knee partial lateral meniscectomy, chondroplasty of lateral femoral condyle, chondroplasty of patella 03/01/2018     V45.89/Z98.890      Plan  · Orders  o Knee (Left) [facility] Preferred View (34990-KG) - 719.46/M25.562 - 05/15/2020  · Medications  o Medications have been Reconciled  o Transition of Care or Provider Policy  · Instructions  o Reviewed the patient's Past Medical, Social, and Family history as well as the ROS at today's visit, no changes.  o Call or return if worsening symptoms.  o Synvisc injection.  o This note was transcribed by Olive hernández/florentino.  · Referrals  o ID: [id number] Date: 04/13/2020 Type: Inbound  Specialty: Orthopedic Surgery            Electronically Signed by: Olive Sotomayor-, Other -Author on May 23, 2020 10:04:15 PM  Electronically Co-signed by: Kylie Pelletier PA-C -Reviewer on June 1, 2020 12:06:37 PM  Electronically Co-signed by: Juan David Clark MD -Reviewer on Soraya 3, 2020 08:36:01 AM

## 2021-05-13 NOTE — PROGRESS NOTES
Progress Note      Patient Name: Sebastian Foley   Patient ID: [id number]   Sex: Male   YOB: 1975    Primary Care Provider: Aliya Ford MD   Referring Provider: Aliya Ford MD    Visit Date: October 7, 2020    Provider: Bi Kemp MD   Location: [facility]   Location Address: [address] [place] [address]  [id number]   Location Phone: [phone]          Chief Complaint     F/u for migraines.       History Of Present Illness  Sebastian Foley is a 45 year old  male who [facility] today referred from Aliya Ford MD.      45-year-old man here for follow-up his migraine headaches but he states that he had 1 shot of Emgality which I gave him in October had another shot and it did not help his migraines.  He is getting migraines at least 8 days out of the month.  He states that 4 times a month it is severe that he has to lie down in a dark quiet room and is nauseated and vomiting.  The next day it is milder.  He also gets mild to moderate headache about twice a week and about 4 times a month he wants to go home early so he does not get worse.  He is not taking preventive medications.  He took sumatriptan as well it did not work for him.  These are the same headaches that he has had for years.       Past Medical History  Arthritis; Chronic migraine without aura; HTN (hypertension); Hyperlipidemia; Hypertension; Hypertension, Benign Essential; Leg pain; Limb Swelling; Migraines; Positive PPD; Precordial chest pain; Renal Calculus; Right-sided Ureterolithiasis         Past Surgical History  Cystoscopy; Kidney Stone Surgery, Unspecified; Knee surgery         Medication List  CLA 1,000 mg oral capsule; Fish Oil oral; Multi Vitamin 9 mg iron/15 mL oral liquid         Allergy List  NO KNOWN DRUG ALLERGIES         Family Medical History  Heart Disease; Diabetes, unspecified type; Hypertension; Family history of Arthritis;  "Family history of heart disease; Family history of diabetes mellitus         Social History  Alcohol (Current some day); Army service; 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; lives with spouse; ; Recreational Drug Use (Never); Tobacco (Former); Working         Review of Systems  · Constitutional  o Denies  o : chills, excessive sweating, fatigue, fever, sycope/passing out, weight gain, weight loss  · Eyes  o Denies  o : changes in vision, blurry vision, double vision  · HENT  o Denies  o : loss of hearing, ringing in the ears, ear aches, sore throat, nasal congestion, sinus pain, nose bleeds, seasonal allergies  · Cardiovascular  o Denies  o : blood clots, swollen legs, anemia, easy burising or bleeding, transfusions  · Respiratory  o Denies  o : shortness of breath, dry cough, productive cough, pneumonia, COPD  · Gastrointestinal  o Denies  o : difficulty swallowing, reflux  · Genitourinary  o Denies  o : incontinence  · Neurologic  o Admits  o : headache  o Denies  o : seizure, stroke, tremor, loss of balance, falls, dizziness/vertigo, difficulty with sleep, numbness/tingling/paresthesia , difficulty with coordination, difficulty with dexterity, weakness  · Musculoskeletal  o Denies  o : neck stiffness/pain, swollen lymph nodes, muscle aches, joint pain, weakness, spasms, sciatica, pain radiating in arm, pain radiating in leg, low back pain  · Endocrine  o Denies  o : diabetes, thyroid disorder  · Psychiatric  o Denies  o : anxiety, depression      Vitals  Date Time BP Position Site L\R Cuff Size HR RR TEMP (F) WT  HT  BMI kg/m2 BSA m2 O2 Sat FR L/min FiO2 HC       10/07/2020 02:42 PM        97.1           10/07/2020 03:29 /84 Sitting    84 - R   174lbs 16oz 5'  7\" 27.41 1.94             Physical Examination     He is alert, fluent, phasic, follows commands well.  Optic disks are normal bilaterally.  There is no focal weakness.  Heart is regular rhythm normal in rate.           Assessment  · Chronic migraine without " aura     346.70/G43.709  He has chronic migraine without aura. I gave him a sample of Aimovig 140 mg injection and I gave him literature regarding the medication. I showed him how to self inject Aimovig and he states that he can do it. I gave him literature as well and gave to him the adverse effects. He will ask his primary care physician if he can get it on days. I told him that he needs to continue taking his preventive medications to see if this can work for him. I told him at this time that abortive treatment is not working for him because his headaches are not controlled. I will see him again in 3 months time for follow-up.    25 minutes was spent for this moderate complexity visit more than half the time was spent face-to-face with the patient for examination, counseling, planning and recommendations      Plan  · Medications  o Medications have been Reconciled  o Transition of Care or Provider Policy  · Instructions  o Encouraged to follow-up with Primary Care Provider for preventative care.            Electronically Signed by: Bi Kemp MD -Author on October 7, 2020 04:12:15 PM

## 2021-05-13 NOTE — PROGRESS NOTES
Progress Note      Patient Name: Sebastian Foley   Patient ID: [id number]   Sex: Male   YOB: 1975    Primary Care Provider: Aliya Ford MD   Referring Provider: Aliya Ford MD    Visit Date: September 14, 2020    Provider: Juan David Clark MD   Location: [facility]   Location Address: [address] [place] [address]  [id number]   Location Phone: [phone]          Chief Complaint  · Right Knee Pain      History Of Present Illness  Sebastian Foley is a 45 year old  male who presents today to [facility].      The patient presents here today for follow up evaluation of right knee pain. His initial complaint is pain over the medial side of the right knee, pointing to the patellar tendon as the source of his pain with some locking and catching. His previous visit we ordered an MRI of his right knee and he is here today to follow up with those results. The patient states his pain is more now anterior rather than the patellar tendon. He has had a previous left knee arthroscopy in the past.       Past Medical History  Arthritis; Chronic migraine without aura; HTN (hypertension); Hyperlipidemia; Hypertension; Hypertension, Benign Essential; Leg pain; Limb Swelling; Migraines; Positive PPD; Precordial chest pain; Renal Calculus; Right-sided Ureterolithiasis         Past Surgical History  Cystoscopy; Kidney Stone Surgery, Unspecified; Knee surgery         Medication List  Aimovig Autoinjector 70 mg/mL subcutaneous auto-injector; CLA 1,000 mg oral capsule; Fish Oil oral; Imitrex 100 mg oral tablet; Multi Vitamin 9 mg iron/15 mL oral liquid         Allergy List  NO KNOWN DRUG ALLERGIES       Allergies Reconciled  Family Medical History  Heart Disease; Diabetes, unspecified type; Hypertension; Family history of Arthritis; Family history of heart disease; Family history of diabetes mellitus         Social History  Alcohol (Current some day); Army service; 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;  "lives with spouse; Recreational Drug Use (Never); Tobacco (Former); Working         Review of Systems  · Constitutional  o Denies  o : fever, chills, weight loss  · Cardiovascular  o Denies  o : chest pain, shortness of breath  · Gastrointestinal  o Denies  o : liver disease, heartburn, nausea, blood in stools  · Genitourinary  o Denies  o : painful urination, blood in urine  · Integument  o Denies  o : rash, itching  · Neurologic  o Denies  o : headache, weakness, loss of consciousness  · Musculoskeletal  o Denies  o : painful, swollen joints  · Psychiatric  o Denies  o : drug/alcohol addiction, anxiety, depression      Vitals  Date Time BP Position Site L\R Cuff Size HR RR TEMP (F) WT  HT  BMI kg/m2 BSA m2 O2 Sat        09/14/2020 10:38 AM      73 - R   176lbs 16oz 5'  7\" 27.72 1.95 97 %          Physical Examination  · Constitutional  o Appearance  o : well developed, well-nourished, no obvious deformities present  · Head and Face  o Head  o :   § Inspection  § : normocephalic  o Face  o :   § Inspection  § : no facial lesions  · Eyes  o Conjunctivae  o : conjunctivae normal  o Sclerae  o : sclerae white  · Ears, Nose, Mouth and Throat  o Ears  o :   § External Ears  § : appearance within normal limits  § Hearing  § : intact  o Nose  o :   § External Nose  § : appearance normal  · Neck  o Inspection/Palpation  o : normal appearance  o Range of Motion  o : full range of motion  · Respiratory  o Respiratory Effort  o : breathing unlabored  o Inspection of Chest  o : normal appearance  o Auscultation of Lungs  o : no audible wheezing or rales  · Cardiovascular  o Heart  o : regular rate  · Gastrointestinal  o Abdominal Examination  o : soft and non-tender  · Skin and Subcutaneous Tissue  o General Inspection  o : intact, no rashes  · Psychiatric  o General  o : Alert and oriented x3  o Judgement and Insight  o : judgment and insight intact  o Mood and Affect  o : mood normal, affect appropriate  · Right " Knee  o Inspection  o : No significant swelling, atrophy, or skin discoloration. Full range of motion. Tenderness of patellar tendon. Positive pain with Hugo's and Apley's. Tenderness of medial joint line. Nontender lateral joint line. Negative Lachman. Negative Posterior Sag. Stable to varus/valgus stress. Good strength of quadriceps, hamstrings, dorsiflexors, and plantar flexors. Neurovascularly intact. Sensation grossly intact. Calf supple; no signs of DVT.   · Imaging  o Imaging  o : MRI [facility] 08/2020: 1. Mild tricompartmental osteoarthritis with suspected small focal high-grade cartilage defect at the posterior lateral femoral condyle and partial-thickness fissuring/fibrillation of the lateral patellar facet. 2. Small intra-articular body is seen in the posterior medial joint recess. 3. No definite findings of acute meniscal or ligamentous injury.           Assessment  · Mild osteoarthritis of right knee     715.16/M17.11  · Mild osteoarthritis of left knee     715.16/M17.12  · Right knee pain, unspecified chronicity     719.46/M25.561  · Patellar tendonitis of both knees       Patellar tendinitis, right knee     726.64/M76.51  Patellar tendinitis, left knee     726.64/M76.52      Plan  · Medications  o Medications have been Reconciled  o Transition of Care or Provider Policy  · Instructions  o MRI reviewed by Dr. Clark.  o Reviewed the patient's Past Medical, Social, and Family history as well as the ROS at today's visit, no changes.  o Call or return if worsening symptoms.  o Follow Up PRN.  o This note was transcribed by Nano Aguilar. florentino.  o Bilateral knee pain. S/p left knee arthroscopy a couple years ago, not showing much improvement. We recommended conservative treatment with periodic injections, with possible visco-supplementation. He has had the viso-supplementation in the left knee in the past with marginal relief for 2-3 weeks. I dont think he is a good candidate for a scope at this time.  Plan for follow up as needed. An order for physical therapy was given today along with home exercises.   o Electronically Identified Patient Education Materials Provided Electronically  · Referrals  o ID: [id number] Date: 07/10/2020 Type: Inbound  Specialty: Orthopedic Surgery            Electronically Signed by: Nano Aguilar MA -Author on September 16, 2020 03:12:18 PM  Electronically Co-signed by: Juan David Clark MD -Reviewer on September 16, 2020 09:18:19 PM

## 2021-05-14 VITALS
SYSTOLIC BLOOD PRESSURE: 134 MMHG | WEIGHT: 178 LBS | DIASTOLIC BLOOD PRESSURE: 81 MMHG | HEIGHT: 67 IN | BODY MASS INDEX: 27.94 KG/M2 | HEART RATE: 86 BPM

## 2021-05-14 VITALS — TEMPERATURE: 97.1 F

## 2021-05-14 VITALS
HEIGHT: 67 IN | WEIGHT: 175 LBS | BODY MASS INDEX: 27.47 KG/M2 | DIASTOLIC BLOOD PRESSURE: 84 MMHG | HEART RATE: 84 BPM | SYSTOLIC BLOOD PRESSURE: 130 MMHG

## 2021-05-14 VITALS — BODY MASS INDEX: 27.47 KG/M2 | WEIGHT: 175 LBS | HEIGHT: 67 IN | OXYGEN SATURATION: 98 % | HEART RATE: 97 BPM

## 2021-05-14 VITALS
HEIGHT: 67 IN | SYSTOLIC BLOOD PRESSURE: 143 MMHG | BODY MASS INDEX: 27.52 KG/M2 | WEIGHT: 175.31 LBS | DIASTOLIC BLOOD PRESSURE: 92 MMHG | HEART RATE: 78 BPM

## 2021-05-14 VITALS — HEIGHT: 67 IN | WEIGHT: 173.37 LBS | BODY MASS INDEX: 27.21 KG/M2 | HEART RATE: 92 BPM | OXYGEN SATURATION: 95 %

## 2021-05-14 VITALS — BODY MASS INDEX: 27.78 KG/M2 | OXYGEN SATURATION: 97 % | HEART RATE: 73 BPM | WEIGHT: 177 LBS | HEIGHT: 67 IN

## 2021-05-14 NOTE — PROGRESS NOTES
Progress Note      Patient Name: Sebastian Foley   Patient ID: [id number]   Sex: Male   YOB: 1975        Create Date: February 25, 2021              Chief Complaint     Pt here for migraine follow up       History Of Present Illness  Sebastian Foley is a 45 year old  male who presents today to [facility] today referred from Aliya Ford MD.      45-year-old man follow-up for his migraine headaches.  He states that he had 3 shots of Aimovig 140 mg and his headaches are mildly improved.  He states that he still gets a headache 4 times a week that is moderate in severity.  In the past he was getting 2 severe headaches a week lasting for 8 hours where he was nauseated, vomiting and light and noise sensitivity.  He states that he could not function with those headaches and had to lay in bed.  With this headaches that he is having at this time he is able to function but he has to stay away from light which bothers him.    In the past he took 1 dose of Emgality.  He has never tried Ajovy.       Past Medical History  Arthritis; Chronic migraine without aura; HTN (hypertension); Hyperlipidemia; Hypertension; Hypertension, Benign Essential; Leg pain; Limb Swelling; Migraines; Positive PPD; Precordial chest pain; Renal Calculus; Right-sided Ureterolithiasis         Past Surgical History  Cystoscopy; Kidney Stone Surgery, Unspecified; Knee surgery         Medication List  Aimovig Autoinjector 140 mg/mL subcutaneous auto-injector; sumatriptan 20 mg/actuation nasal spray,non-aerosol         Allergy List  NO KNOWN DRUG ALLERGIES         Family Medical History  Heart Disease; Diabetes, unspecified type; Hypertension; Family history of Arthritis; Family history of heart disease; Family history of diabetes mellitus         Social History  Alcohol (Current some day); Army service; 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; lives with spouse; ; Recreational Drug Use (Never); Tobacco (Former); Working         Review of  "Systems  · Constitutional  o Denies  o : chills, excessive sweating, fatigue, fever, sycope/passing out, weight gain, weight loss  · Eyes  o Denies  o : changes in vision, blurred vision, double vision  · HENT  o Denies  o : hearing loss, ringing in the ears, ear aches, sore throat, nasal congestion, sinus pain, nose bleeds, seasonal allergies  · Cardiovascular  o Denies  o : blood clots, swollen legs, anemia, easy burising or bleeding, transfusions  · Respiratory  o Denies  o : shortness of breath, dry cough, productive cough, pneumonia, COPD  · Gastrointestinal  o Denies  o : dysphagia, reflux  · Genitourinary  o Denies  o : incontinence  · Neurologic  o Admits  o : headache  o Denies  o : seizure, stroke, tremor, loss of balance, falls, dizziness/vertigo, difficulty with sleep, numbness/tingling/paresthesia , difficulty with coordination, difficulty with dexterity, weakness  · Musculoskeletal  o Denies  o : neck stiffness/pain, swollen lymph nodes, muscle aches, joint pain, weakness, spasms, sciatica, pain radiating in arm, pain radiating in leg, low back pain  · Endocrine  o Denies  o : diabetes, thyroid disorder  · Psychiatric  o Denies  o : anxiety, depression      Vitals  Date Time BP Position Site L\R Cuff Size HR RR TEMP (F) WT  HT  BMI kg/m2 BSA m2 O2 Sat FR L/min FiO2 HC       02/25/2021 08:35 /92 Sitting    78 - R   175lbs 5oz 5'  7\" 27.46 1.94             Physical Examination     He is alert, fluent, phasic, follows commands well.  Optic disks are normal bilaterally.  Heart is regular rhythm normal in rate.           Assessment  · Chronic migraine without aura     346.70/G43.709  He is to continue taking Aimovig 140 mg subcutaneously monthly. I discussed with him regarding using Ajovy in the future should his headaches not be better controlled and he is interested in Botox injections. I showed him a chart regarding the injection sites and told him that there is going to be 31 injection sites. I " discussed with him regarding the adverse effects including ptosis, neck weakness. I will make a follow-up appointment for him to see MABER Beverly.    Total time spent with patient and coordinating patient care was 25 minutes.      Plan  · Medications  o Medications have been Reconciled  o Transition of Care or Provider Policy  · Instructions  o Encouraged to follow-up with Primary Care Provider for preventative care.            Electronically Signed by: Bi Kemp MD -Author on February 25, 2021 10:24:56 AM

## 2021-05-14 NOTE — PROGRESS NOTES
Progress Note      Patient Name: Sebastian Foley   Patient ID: [id number]   Sex: Male   YOB: 1975    Primary Care Provider: Aliya Ford MD   Referring Provider: Aliya Ford MD    Visit Date: February 3, 2021    Provider: Kylie Pelletier PA-C   Location: [facility]   Location Address: [address] [place] [address]  [id number]   Location Phone: [phone]          Chief Complaint  · Right knee pain       History Of Present Illness  Sebastian Foley is a 45 year old  male who presents today to [facility].      Patient is following up for right knee pain, right knee osteoarthritis. Patient denies recent injury or trauma. Patient is here to receive Synvisc injection.       Past Medical History  Arthritis; Chronic migraine without aura; HTN (hypertension); Hyperlipidemia; Hypertension; Hypertension, Benign Essential; Leg pain; Limb Swelling; Migraines; Positive PPD; Precordial chest pain; Renal Calculus; Right-sided Ureterolithiasis         Past Surgical History  Cystoscopy; Kidney Stone Surgery, Unspecified; Knee surgery         Medication List  Aimovig Autoinjector 140 mg/mL subcutaneous auto-injector; Catapres 0.1 mg oral tablet; CLA 1,000 mg oral capsule; Effexor  mg oral capsule,extended release 24hr; Fish Oil oral; hydroxyzine HCl 25 mg oral tablet; Lamictal 100 mg oral tablet; Lunesta 3 mg oral tablet; Multi Vitamin 9 mg iron/15 mL oral liquid; naproxen 500 mg oral tablet; prazosin 2 mg oral capsule; sertraline 100 mg oral tablet; sumatriptan 20 mg/actuation nasal spray,non-aerosol         Allergy List  NO KNOWN DRUG ALLERGIES         Family Medical History  Heart Disease; Diabetes, unspecified type; Hypertension; Family history of Arthritis; Family history of heart disease; Family history of diabetes mellitus         Social History  Alcohol (Current some day); Army service; 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; lives with spouse; ; Recreational Drug Use (Never);  "Tobacco (Former); Working         Review of Systems  · Constitutional  o Denies  o : fever, chills, weight loss  · Cardiovascular  o Denies  o : chest pain, shortness of breath  · Gastrointestinal  o Denies  o : liver disease, heartburn, nausea, blood in stools  · Genitourinary  o Denies  o : painful urination, blood in urine  · Integument  o Denies  o : rash, itching  · Neurologic  o Denies  o : headache, weakness, loss of consciousness  · Musculoskeletal  o Denies  o : painful, swollen joints  · Psychiatric  o Denies  o : drug/alcohol addiction, anxiety, depression      Vitals  Date Time BP Position Site L\R Cuff Size HR RR TEMP (F) WT  HT  BMI kg/m2 BSA m2 O2 Sat FR L/min FiO2 HC       02/03/2021 10:36 AM      97 - R   174lbs 16oz 5'  7\" 27.41 1.94 98 %            Physical Examination  · Constitutional  o Appearance  o : well developed, well-nourished, no obvious deformities present  · Head and Face  o Head  o :   § Inspection  § : normocephalic  o Face  o :   § Inspection  § : no facial lesions  · Eyes  o Conjunctivae  o : conjunctivae normal  o Sclerae  o : sclerae white  · Ears, Nose, Mouth and Throat  o Ears  o :   § External Ears  § : appearance within normal limits  § Hearing  § : intact  o Nose  o :   § External Nose  § : appearance normal  · Neck  o Inspection/Palpation  o : normal appearance  o Range of Motion  o : full range of motion  · Respiratory  o Respiratory Effort  o : breathing unlabored  o Inspection of Chest  o : normal appearance  o Auscultation of Lungs  o : no audible wheezing or rales  · Cardiovascular  o Heart  o : regular rate  · Gastrointestinal  o Abdominal Examination  o : soft and non-tender  · Skin and Subcutaneous Tissue  o General Inspection  o : intact, no rashes  · Psychiatric  o General  o : Alert and oriented x3  o Judgement and Insight  o : judgment and insight intact  o Mood and Affect  o : mood normal, affect appropriate  · Injection Note/Aspiration Note  o Site  o : right " knee  o Procedure  o : Procedure: After educating the patient, patient gave consent for procedure. After using Chloraprep, the joint space was injected. The patient tolerated the procedure well.  o Medication  o : Synvisc One 48 mg  · Right Knee-Street  o Inspection  o : no limping gait, weight bearing, no swelling, no ecchymosis, no atrophy, neutral alignment  o Palpation  o : tenderness at medial joint line, tenderness at lateral joint line, no patellar tendon tenderness, no pain of MCL, no pain at LCL  o ROM  o : full extension, full flexion  o Strength  o : full extension, full flexion  o Special Tests  o : negative varus stress, negaitve valgus stress  o Neurovascular  o : Full sensation, Dorsal Pedal Pulse 2+, posteriror tibialis pulse 2+          Assessment  · Primary osteoarthritis of right knee     715.16/M17.11      Plan  · Orders  o Hyaluronan or derivative, Synvisc or Synvisc-One, for intra-didier () - 715.16/M17.11 - 02/03/2021   Lot [id number] Exp 03 2023 [employer] Administered by Reshma Pelletier PA-C   o Knee Intra-articular Injection without US Guidance [facility] (91162) - 715.16/M17.11 - 02/03/2021   Administered by Reshma Pelletier PA-C   · Medications  o Medications have been Reconciled  o Transition of Care or Provider Policy  · Instructions  o Reviewed the patient's Past Medical, Social, and Family history as well as the ROS at today's visit, no changes.  o Call or return if worsening symptoms.  o Follow Up PRN.  o Electronically Identified Patient Education Materials Provided Electronically            Electronically Signed by: Kylie Pelletier PA-C -Author on February 3, 2021 10:45:48 AM  Electronically Co-signed by: Juan David Clark MD -Reviewer on February 4, 2021 08:32:22 PM

## 2021-05-14 NOTE — PROGRESS NOTES
Progress Note      Patient Name: Sebastian Foley   Patient ID: [id number]   Sex: Male   YOB: 1975    Primary Care Provider: Aliya Ford MD   Referring Provider: Aliya Ford MD    Visit Date: January 13, 2021    Provider: Kylie Pelletier PA-C   Location: [facility]   Location Address: [address] [place] [address]  [id number]   Location Phone: [phone]          Chief Complaint  · right knee pain      History Of Present Illness  Sebastian Foley is a 45 year old  male who presents today to [facility].      Patient is here for left knee pain, left knee osteoarthritis. Patient denies recent injury or trauma. Patient is here to receive Synvisc injection.       Past Medical History  Arthritis; Chronic migraine without aura; HTN (hypertension); Hyperlipidemia; Hypertension; Hypertension, Benign Essential; Leg pain; Limb Swelling; Migraines; Positive PPD; Precordial chest pain; Renal Calculus; Right-sided Ureterolithiasis         Past Surgical History  Cystoscopy; Kidney Stone Surgery, Unspecified; Knee surgery         Medication List  Aimovig Autoinjector 140 mg/mL subcutaneous auto-injector; Catapres 0.1 mg oral tablet; CLA 1,000 mg oral capsule; Effexor  mg oral capsule,extended release 24hr; Fish Oil oral; hydroxyzine HCl 25 mg oral tablet; Lamictal 100 mg oral tablet; Lunesta 3 mg oral tablet; Multi Vitamin 9 mg iron/15 mL oral liquid; naproxen 500 mg oral tablet; prazosin 2 mg oral capsule; sertraline 100 mg oral tablet; sumatriptan 20 mg/actuation nasal spray,non-aerosol         Allergy List  NO KNOWN DRUG ALLERGIES         Family Medical History  Heart Disease; Diabetes, unspecified type; Hypertension; Family history of Arthritis; Family history of heart disease; Family history of diabetes mellitus         Social History  Alcohol (Current some day); Army service; 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; lives with spouse; ; Recreational Drug Use (Never); Tobacco  "(Former); Working         Review of Systems  · Constitutional  o Denies  o : fever, chills, weight loss  · Cardiovascular  o Denies  o : chest pain, shortness of breath  · Gastrointestinal  o Denies  o : liver disease, heartburn, nausea, blood in stools  · Genitourinary  o Denies  o : painful urination, blood in urine  · Integument  o Denies  o : rash, itching  · Neurologic  o Denies  o : headache, weakness, loss of consciousness  · Musculoskeletal  o Denies  o : painful, swollen joints  · Psychiatric  o Denies  o : drug/alcohol addiction, anxiety, depression      Vitals  Date Time BP Position Site L\R Cuff Size HR RR TEMP (F) WT  HT  BMI kg/m2 BSA m2 O2 Sat FR L/min FiO2 HC       01/13/2021 02:34 PM      92 - R   173lbs 6oz 5'  7\" 27.15 1.93 95 %            Physical Examination  · Constitutional  o Appearance  o : well developed, well-nourished, no obvious deformities present  · Head and Face  o Head  o :   § Inspection  § : normocephalic  o Face  o :   § Inspection  § : no facial lesions  · Eyes  o Conjunctivae  o : conjunctivae normal  o Sclerae  o : sclerae white  · Ears, Nose, Mouth and Throat  o Ears  o :   § External Ears  § : appearance within normal limits  § Hearing  § : intact  o Nose  o :   § External Nose  § : appearance normal  · Neck  o Inspection/Palpation  o : normal appearance  o Range of Motion  o : full range of motion  · Respiratory  o Respiratory Effort  o : breathing unlabored  o Inspection of Chest  o : normal appearance  o Auscultation of Lungs  o : no audible wheezing or rales  · Cardiovascular  o Heart  o : regular rate  · Gastrointestinal  o Abdominal Examination  o : soft and non-tender  · Skin and Subcutaneous Tissue  o General Inspection  o : intact, no rashes  · Psychiatric  o General  o : Alert and oriented x3  o Judgement and Insight  o : judgment and insight intact  o Mood and Affect  o : mood normal, affect appropriate  · Injection Note/Aspiration Note  o Site  o : left " knee  o Procedure  o : Procedure: After educating the patient, patient gave consent for procedure. After using Chloraprep, the joint space was injected. The patient tolerated the procedure well.  o Medication  o : Synvisc One 48 mg  · Left Knee-Street  o Inspection  o : no limping gait, weight bearing, no swelling, no ecchymosis, no atrophy, neutral alignment  o Palpation  o : tenderness at medial joint line, tenderness at lateral joint line, no patellar tendon tenderness, no pain of MCL, no pain at LCL  o ROM  o : full extension, full flexion  o Strength  o : full extension, full flexion  o Special Tests  o : negative varus stress, negaitve valgus stress  o Neurovascular  o : Full sensation, Dorsal Pedal Pulse 2+, posteriror tibialis pulse 2+          Assessment  · Primary osteoarthritis of left knee     715.16/M17.12  · Pain: Knee     719.46/M25.569      Plan  · Orders  o Hyaluronan or derivative, gel-one, for intra-articular injection () - 715.16/M17.12 - 01/13/2021   Synvisc [employer] LOT [id number] 2 2023 Administered by Kylie WINTERS  o Knee Intra-articular Injection without US Guidance [facility] ([id number]) - 715.16/M17.12 - 01/13/2021   Administered by Kylie WINTERS  · Medications  o Medications have been Reconciled  o Transition of Care or Provider Policy  · Instructions  o Reviewed the patient's Past Medical, Social, and Family history as well as the ROS at today's visit, no changes.  o Call or return if worsening symptoms.  o Follow up in 1 week.  o Electronically Identified Patient Education Materials Provided Electronically  · Referrals  o ID: [id number] Date: 07/10/2020 Type: Inbound  Specialty: Orthopedic Surgery            Electronically Signed by: DAVIS KumarC -Author on January 13, 2021 03:11:32 PM

## 2021-05-14 NOTE — PROGRESS NOTES
Progress Note      Patient Name: Sebastian Foley   Patient ID: [id number]   Sex: Male   YOB: 1975    Primary Care Provider: Aliya Ford MD   Referring Provider: Aliya Ford MD    Visit Date: March 19, 2021    Provider: AMBER Beverly   Location: [facility]   Location Address: [address] [place] [address]  [id number]   Location Phone: [phone]          Chief Complaint  · Migraines      History Of Present Illness  Sebastian Foley is a 45 year old  male who presents today to [facility] today referred from Aliya Ford MD for follow up. States he's been having 16+ headache days per month. Endorses photophobia, phonophobia and nausea. Headaches are holocranial. States sumatriptan is ineffective for abortive therapy. No significant improvement with Aimovig/Emgality.      Prior prophylactic migraine medications: lamictal, Effexor, propranolol, Aimovig, Emgality  Prior abortive migraine medication: sumatriptan       Past Medical History  Arthritis; Chronic migraine without aura; HTN (hypertension); Hyperlipidemia; Hypertension; Hypertension, Benign Essential; Leg pain; Limb Swelling; Migraines; Positive PPD; Precordial chest pain; Renal Calculus; Right-sided Ureterolithiasis         Past Surgical History  Cystoscopy; Kidney Stone Surgery, Unspecified; Knee surgery         Medication List  Aimovig Autoinjector 140 mg/mL subcutaneous auto-injector; sumatriptan 20 mg/actuation nasal spray,non-aerosol         Allergy List  NO KNOWN DRUG ALLERGIES       Allergies Reconciled  Family Medical History  Heart Disease; Diabetes, unspecified type; Hypertension; Family history of Arthritis; Family history of heart disease; Family history of diabetes mellitus         Social History  Alcohol (Current some day); Army service; 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; lives with spouse; ; Recreational Drug Use (Never); Tobacco (Former); Working         Review of  "Systems  · Constitutional  o Denies  o : chills, excessive sweating, fatigue, fever, sycope/passing out, weight gain, weight loss  · Eyes  o Denies  o : changes in vision, blurry vision, double vision  · HENT  o Denies  o : loss of hearing, ringing in the ears, ear aches, sore throat, nasal congestion, sinus pain, nose bleeds, seasonal allergies  · Cardiovascular  o Denies  o : blood clots, swollen legs, anemia, easy burising or bleeding, transfusions  · Respiratory  o Denies  o : shortness of breath, dry cough, productive cough, pneumonia, COPD  · Gastrointestinal  o Denies  o : difficulty swallowing, reflux  · Genitourinary  o Denies  o : incontinence  · Neurologic  o Admits  o : headache  o Denies  o : seizure, stroke, tremor, loss of balance, falls, dizziness/vertigo, difficulty with sleep, numbness/tingling/paresthesia , difficulty with coordination, difficulty with dexterity, weakness  · Musculoskeletal  o Denies  o : neck stiffness/pain, swollen lymph nodes, muscle aches, joint pain, weakness, spasms, sciatica, pain radiating in arm, pain radiating in leg, low back pain  · Endocrine  o Denies  o : diabetes, thyroid disorder  · Psychiatric  o Denies  o : anxiety, depression      Vitals  Date Time BP Position Site L\R Cuff Size HR RR TEMP (F) WT  HT  BMI kg/m2 BSA m2 O2 Sat FR L/min FiO2        03/19/2021 08:03 /81 Sitting    86 - R   178lbs 0oz 5'  7\" 27.88 1.95             Physical Examination  · Constitutional  o Appearance  o : well-nourished, well groomed, in no apparent distress  · Eyes  o Pupils and Irises  o : Pupils equal, round, and reactive to light and accommodation bilaterally  · Respiratory  o Auscultation of Lungs  o : Lungs were clear to ascultation bilaterally. No wheezes, rhonchi or rales were appreciated.  · Cardiovascular  o Heart  o :   § Auscultation of Heart  § : Regular rate and rhythm, no murmurs, gallops or rubs were appreciated.  o Peripheral Vascular System  o : "   § Extremities  § : No peripheral edema was appreciated  · Musculoskeletal  o General  o : Normal bulk and normal tone throughout. 5/5 motor strength throughout and symmetric. Normal gait and station.  · Neurologic  o Mental Status Examination  o :   § Orientation  § : Alert and oriented to person, place, and time,  § Speech/Language  § : Intact naming, comprehension, and repetition. No dysarthria.  § Memory  § : Immediate recall is 3/3. Recall at 5 minutes is 3/3.   § Attention  § : Attention span is intact to serial 7 examination and finger tapping.   § Fund of Knowledge  § : Adequate fund of knowledge  o Cranial Nerves  o : Pupils are equal, round and reactive to light. Extraocular movements are intact. Visual fields are full. Fundoscopic examination reveals sharp disc bilaterally. Sensation in the V1-V3 distribution is intact and symmetric. Muscles of mastication are strong and symmetric. Muscles of facial expression are strong and symmetric. Hearing is intact. Palatal raise is intact and symmetric. Uvula is midline. Shoulder shrug is strong. Tongue protrudes in the midline.  o Motor Examination  o :   § RUE Strength  § : strength normal  § LUE Strength  § : strength normal  § RLE Strength  § : strength normal  § LLE Strength  § : strength normal  o Reflexes  o : 2+ reflexes throughout and symmetric. Negative Joyce. Negative Babinski.   o Sensation  o : Intact sensation to light touch, pinprick, vibration and proprioception throughout.  o Gait and Station  o :   § Gait Screening  § : Normal, narrow based gait, with a normal arm swing.  o Coordination  o : Intact finger to nose and heel to shin. Rapid alternating movements are intact in the upper and lower extremities.          Assessment  · Intractable chronic migraine without aura and without status migrainosus     346.71/G43.719  Will start Botox for preventative therapy of migraine and continue imtirex for abortive therapy.        Plan  · Medications  o Medications have been Reconciled  o Transition of Care or Provider Policy  · Instructions  o Encouraged to follow-up with Primary Care Provider for preventative care.  o Call or Return if symptoms persist.  · Referrals  o ID: [id number] Date: 10/07/2020 Type: Inbound  Specialty: Neurology            Electronically Signed by: Tiffanie Dos Santos APRN -Author on March 19, 2021 02:34:35 PM

## 2021-05-15 VITALS
HEIGHT: 67 IN | DIASTOLIC BLOOD PRESSURE: 71 MMHG | SYSTOLIC BLOOD PRESSURE: 118 MMHG | HEART RATE: 53 BPM | WEIGHT: 174 LBS | BODY MASS INDEX: 27.31 KG/M2

## 2021-05-15 VITALS — WEIGHT: 177 LBS | HEIGHT: 67 IN | HEART RATE: 65 BPM | OXYGEN SATURATION: 97 % | BODY MASS INDEX: 27.78 KG/M2

## 2021-05-15 VITALS — BODY MASS INDEX: 27 KG/M2 | HEIGHT: 67 IN | HEART RATE: 96 BPM | OXYGEN SATURATION: 98 % | WEIGHT: 172 LBS

## 2021-05-15 VITALS — BODY MASS INDEX: 26.84 KG/M2 | HEIGHT: 67 IN | WEIGHT: 171 LBS | HEART RATE: 77 BPM | OXYGEN SATURATION: 98 %

## 2021-05-15 VITALS — BODY MASS INDEX: 27.47 KG/M2 | HEIGHT: 67 IN | WEIGHT: 175 LBS

## 2021-05-15 VITALS — HEIGHT: 67 IN | WEIGHT: 172 LBS | BODY MASS INDEX: 27 KG/M2

## 2021-05-16 VITALS — OXYGEN SATURATION: 98 % | HEIGHT: 67 IN | HEART RATE: 81 BPM | WEIGHT: 171 LBS | BODY MASS INDEX: 26.84 KG/M2

## 2021-05-16 VITALS — HEIGHT: 67 IN | WEIGHT: 168 LBS | OXYGEN SATURATION: 98 % | BODY MASS INDEX: 26.37 KG/M2 | HEART RATE: 85 BPM

## 2021-05-16 VITALS — WEIGHT: 169 LBS | HEART RATE: 78 BPM | BODY MASS INDEX: 26.53 KG/M2 | HEIGHT: 67 IN | OXYGEN SATURATION: 99 %

## 2021-05-16 VITALS — WEIGHT: 171 LBS | BODY MASS INDEX: 26.84 KG/M2 | HEIGHT: 67 IN | HEART RATE: 84 BPM | OXYGEN SATURATION: 98 %

## 2021-07-19 ENCOUNTER — OFFICE VISIT (OUTPATIENT)
Dept: ORTHOPEDIC SURGERY | Facility: CLINIC | Age: 46
End: 2021-07-19

## 2021-07-19 VITALS — HEIGHT: 67 IN | HEART RATE: 72 BPM | WEIGHT: 181 LBS | BODY MASS INDEX: 28.41 KG/M2 | OXYGEN SATURATION: 96 %

## 2021-07-19 DIAGNOSIS — M17.12 PRIMARY OSTEOARTHRITIS OF LEFT KNEE: Primary | ICD-10-CM

## 2021-07-19 PROCEDURE — 20610 DRAIN/INJ JOINT/BURSA W/O US: CPT | Performed by: PHYSICIAN ASSISTANT

## 2021-07-19 RX ORDER — CLONIDINE HYDROCHLORIDE 0.1 MG/1
TABLET ORAL
COMMUNITY
Start: 2021-05-20

## 2021-07-19 RX ORDER — LAMOTRIGINE 200 MG/1
TABLET ORAL
COMMUNITY
Start: 2021-05-20

## 2021-07-19 RX ORDER — MELOXICAM 15 MG/1
15 TABLET ORAL DAILY
COMMUNITY
Start: 2021-02-01 | End: 2022-02-01

## 2021-07-19 RX ORDER — SERTRALINE HYDROCHLORIDE 100 MG/1
TABLET, FILM COATED ORAL
COMMUNITY
Start: 2021-05-20

## 2021-07-19 RX ORDER — SUMATRIPTAN 20 MG/1
SPRAY NASAL
COMMUNITY

## 2021-07-19 RX ORDER — ESZOPICLONE 3 MG/1
3 TABLET, FILM COATED ORAL NIGHTLY
COMMUNITY
Start: 2021-06-21

## 2021-07-19 RX ORDER — VENLAFAXINE HYDROCHLORIDE 150 MG/1
150 CAPSULE, EXTENDED RELEASE ORAL DAILY
COMMUNITY
Start: 2021-05-20

## 2021-07-19 RX ORDER — LURASIDONE HYDROCHLORIDE 20 MG/1
20 TABLET, FILM COATED ORAL DAILY
COMMUNITY
Start: 2021-02-04

## 2021-07-19 RX ORDER — HYDROXYZINE HYDROCHLORIDE 25 MG/1
TABLET, FILM COATED ORAL
COMMUNITY

## 2021-07-19 RX ORDER — ERENUMAB-AOOE 140 MG/ML
INJECTION, SOLUTION SUBCUTANEOUS
COMMUNITY
Start: 2021-02-25

## 2021-07-19 RX ORDER — PRAZOSIN HYDROCHLORIDE 2 MG/1
2 CAPSULE ORAL NIGHTLY
COMMUNITY
Start: 2021-05-20

## 2021-07-19 RX ORDER — SILDENAFIL 100 MG/1
TABLET, FILM COATED ORAL
COMMUNITY
Start: 2021-06-21

## 2021-07-19 RX ORDER — CLONIDINE HYDROCHLORIDE 0.1 MG/1
TABLET ORAL
COMMUNITY

## 2021-07-19 RX ORDER — LAMOTRIGINE 100 MG/1
TABLET ORAL
COMMUNITY

## 2021-07-19 NOTE — PROGRESS NOTES
"Chief Complaint  Pain of the Left Knee    Subjective          Sebastian Foley presents to [facility] for follow up of left knee pain. Patient has history of knee arthroscopy in 2018. He states his knee tends to give out on him when he goes up and down the stairs. He last received Synvisc of his left knee in Soraya 3, 2020. Patient states that the injection helped for several months. He presents today to receive Synvisc of the left knee.     Objective   Vital Signs:   Pulse 72   Ht 170.2 cm (67\")   Wt 82.1 kg (181 lb)   SpO2 96%   BMI 28.35 kg/m²       Physical Exam  Constitutional:       Appearance: Normal appearance. He is well-developed and normal weight.   HENT:      Head: Normocephalic.      Right Ear: Hearing and external ear normal.      Left Ear: Hearing and external ear normal.      Nose: Nose normal.   Eyes:      Conjunctiva/sclera: Conjunctivae normal.   Cardiovascular:      Rate and Rhythm: Normal rate.   Pulmonary:      Effort: Pulmonary effort is normal.      Breath sounds: No wheezing or rales.   Abdominal:      Palpations: Abdomen is soft.      Tenderness: There is no abdominal tenderness.   Musculoskeletal:      Cervical back: Normal range of motion.   Skin:     Findings: No rash.   Neurological:      Mental Status: He is alert and oriented to person, place, and time.   Psychiatric:         Mood and Affect: Mood and affect normal.         Judgment: Judgment normal.     Ortho Exam  Left knee: Tender medial and lateral joint lines.  No swelling, atrophy or deformity.  Full active range of motion of flexion extension.  Positive crepitus.  Stable to varus valgus stress.  Sensation is intact.  Neurovascular intact.  Posterior tibialis pulses 2+.  Full weightbearing.  Gait is normal.  Result Review :            Imaging Results (Most Recent)     None         Large Joint Arthrocentesis: L knee  Date/Time: 7/19/2021 8:41 AM  Consent given by: patient  Site marked: site " marked  Timeout: Immediately prior to procedure a time out was called to verify the correct patient, procedure, equipment, support staff and site/side marked as required   Supporting Documentation  Indications: pain   Procedure Details  Location: knee - L knee  Preparation: Patient was prepped and draped in the usual sterile fashion  Needle gauge: 21G.  Medications administered: 48 mg hylan 48 MG/6ML  Patient tolerance: patient tolerated the procedure well with no immediate complications            Assessment and Plan    Problem List Items Addressed This Visit        Musculoskeletal and Injuries    Primary osteoarthritis of left knee - Primary    Relevant Orders    Large Joint Arthrocentesis: L knee          Follow Up   Return in about 1 week (around 7/26/2021).  Patient Instructions   Patient will follow up in 1 week to receive Synvisc injection of the right knee.   Synvisc One Injections  · If you received a Synvisc One injection, or other brand of gel injection not listed: You may experience some discomfort following the injection. Your knee(s) may feel stiff or swollen following the injection. This occurs because there is a substance injected into the knee joint space that is not normally present.  · If you develop pain or discomfort  Following the injection you should elevate the affected area, use an ace wrap if possible, ice the area and take ibuprofen or tylenol (if you are able to).  You may wrap the affected area for as long as you would like or feels comfortable.  · These injections typically work on approximately 70% of patients. We have had patients report the injections may not work to the fullest extent following the first injection but seem to improve with subsequent injections.   · These injections can be given every 6 months and 1 day. Some patients experience relief for this amount of time and others may experience shorter or longer duration of relief.      Patient was given instructions and  counseling regarding his condition or for health maintenance advice. Please see specific information pulled into the AVS if appropriate.

## 2021-07-19 NOTE — PATIENT INSTRUCTIONS
Patient will follow up in 1 week to receive Synvisc injection of the right knee.   Synvisc One Injections  · If you received a Synvisc One injection, or other brand of gel injection not listed: You may experience some discomfort following the injection. Your knee(s) may feel stiff or swollen following the injection. This occurs because there is a substance injected into the knee joint space that is not normally present.  · If you develop pain or discomfort  Following the injection you should elevate the affected area, use an ace wrap if possible, ice the area and take ibuprofen or tylenol (if you are able to).  You may wrap the affected area for as long as you would like or feels comfortable.  · These injections typically work on approximately 70% of patients. We have had patients report the injections may not work to the fullest extent following the first injection but seem to improve with subsequent injections.   · These injections can be given every 6 months and 1 day. Some patients experience relief for this amount of time and others may experience shorter or longer duration of relief.

## 2021-08-20 ENCOUNTER — OFFICE VISIT (OUTPATIENT)
Dept: ORTHOPEDIC SURGERY | Facility: CLINIC | Age: 46
End: 2021-08-20

## 2021-08-20 VITALS — WEIGHT: 177 LBS | OXYGEN SATURATION: 97 % | HEART RATE: 119 BPM | BODY MASS INDEX: 27.78 KG/M2 | HEIGHT: 67 IN

## 2021-08-20 DIAGNOSIS — M17.11 PRIMARY OSTEOARTHRITIS OF RIGHT KNEE: ICD-10-CM

## 2021-08-20 DIAGNOSIS — M25.561 RIGHT KNEE PAIN, UNSPECIFIED CHRONICITY: Primary | ICD-10-CM

## 2021-08-20 PROCEDURE — 20610 DRAIN/INJ JOINT/BURSA W/O US: CPT | Performed by: PHYSICIAN ASSISTANT

## 2021-08-20 NOTE — PROGRESS NOTES
"Chief Complaint  Follow-up and Pain of the Right Knee    Subjective          Sebastian Foley presents to [facility] for follow up of right knee pain. Patient presents today to receive Synvisc injection of his right knee. He has had Synvisc injection of his right knee before with good outcome. His last injection was was over six months ago.     Objective   Vital Signs:   Pulse 119   Ht 170.2 cm (67\")   Wt 80.3 kg (177 lb)   SpO2 97%   BMI 27.72 kg/m²       Physical Exam  Constitutional:       Appearance: Normal appearance. Patient is well-developed and normal weight.   HENT:      Head: Normocephalic.      Right Ear: Hearing and external ear normal.      Left Ear: Hearing and external ear normal.      Nose: Nose normal.   Eyes:      Conjunctiva/sclera: Conjunctivae normal.   Cardiovascular:      Rate and Rhythm: Normal rate.   Pulmonary:      Effort: Pulmonary effort is normal.      Breath sounds: No wheezing or rales.   Abdominal:      Palpations: Abdomen is soft.      Tenderness: There is no abdominal tenderness.   Musculoskeletal:      Cervical back: Normal range of motion.   Skin:     Findings: No rash.   Neurological:      Mental Status: Patient is alert and oriented to person, place, and time.   Psychiatric:         Mood and Affect: Mood and affect normal.         Judgment: Judgment normal.     Ortho Exam  Right knee: Tenderness medial joint line. Tenderness lateral joint line. No swelling, atrophy, deformity or discoloration. Full weightbearing, normal gait. Full active range of motion with flexion extension. Stable to varus and valgus stress. Sensation is intact. Neurovascular intact. Posterior tibialis pulse 2+. Dorsalis pedis pulse 2+.    Result Review :   The following data was reviewed by: SINGH Evans on 08/20/2021:         Imaging Results (Most Recent)     None         Large Joint Arthrocentesis: R knee  Date/Time: 8/20/2021 1:00 PM  Consent given by: " patient  Site marked: site marked  Timeout: Immediately prior to procedure a time out was called to verify the correct patient, procedure, equipment, support staff and site/side marked as required   Supporting Documentation  Indications: pain   Procedure Details  Location: knee - R knee  Needle gauge: 21g.  Medications administered: 48 mg hylan 48 MG/6ML  Patient tolerance: patient tolerated the procedure well with no immediate complications            Assessment and Plan    Problem List Items Addressed This Visit     None      Visit Diagnoses     Right knee pain, unspecified chronicity    -  Primary    Relevant Orders    Large Joint Arthrocentesis: R knee          Follow Up   Return if symptoms worsen or fail to improve.  Patient Instructions   Patient given right knee Synvisc injection today. Recommend RICE therapy for 48 hours following today's injection.  Call with any changes or concerns.     Patient was given instructions and counseling regarding his condition or for health maintenance advice. Please see specific information pulled into the AVS if appropriate.

## 2021-08-20 NOTE — PATIENT INSTRUCTIONS
Patient given right knee Synvisc injection today. Recommend RICE therapy for 48 hours following today's injection.  Call with any changes or concerns.

## 2021-12-20 ENCOUNTER — TELEPHONE (OUTPATIENT)
Dept: ORTHOPEDIC SURGERY | Facility: CLINIC | Age: 46
End: 2021-12-20

## 2021-12-20 NOTE — TELEPHONE ENCOUNTER
Caller: ESTELLE  Relationship to Patient: SELF    Phone Number: [phone]  Reason for Call: PT CALLED STATING THAT HE WOULD LIKE TO DO A SYNVISC SERIES AGAIN BILAT KNEES. PLEASE ADVISE PT AT ABOVE PHONE NUMBER

## 2021-12-22 NOTE — TELEPHONE ENCOUNTER
REQUEST SUBMITTED TO  FOR BILATERAL KNEE SYNVISC ONE INJECTIONS. WILL CONTACT PATIENT ONCE APPROVED. TYPICAL TAT 48 HOURS.

## 2021-12-28 NOTE — TELEPHONE ENCOUNTER
SYNVISC ONE APPROVED BY [employer], AUTHORIZATION # [id number], EFFECTIVE 12/16/21-06/13/22. SENT TO JESSICA FOR SCHEDULING.

## 2022-01-24 ENCOUNTER — OFFICE VISIT (OUTPATIENT)
Dept: ORTHOPEDIC SURGERY | Facility: CLINIC | Age: 47
End: 2022-01-24

## 2022-01-24 VITALS — HEIGHT: 67 IN | OXYGEN SATURATION: 98 % | WEIGHT: 183.6 LBS | BODY MASS INDEX: 28.82 KG/M2 | HEART RATE: 88 BPM

## 2022-01-24 DIAGNOSIS — M17.12 PRIMARY OSTEOARTHRITIS OF LEFT KNEE: Primary | ICD-10-CM

## 2022-01-24 PROCEDURE — 20610 DRAIN/INJ JOINT/BURSA W/O US: CPT | Performed by: PHYSICIAN ASSISTANT

## 2022-01-24 RX ORDER — OXYCODONE HYDROCHLORIDE AND ACETAMINOPHEN 5; 325 MG/1; MG/1
TABLET ORAL
COMMUNITY

## 2022-01-24 RX ORDER — SUMATRIPTAN 50 MG/1
TABLET, FILM COATED ORAL
COMMUNITY

## 2022-01-24 RX ORDER — NAPROXEN 500 MG/1
TABLET ORAL
COMMUNITY

## 2022-01-24 NOTE — PROGRESS NOTES
"Chief Complaint  Pain of the Left Knee    Subjective          Sebastian Foley presents to [facility] for follow up of left knee pain, left knee osteoarthritis. He presents today to receive Synvisc injection of his left knee.  He reports having good outcome following Synvisc injections in the past.  He has history of left knee arthroscopy 3 times, his most recent was 2018.  He has no other new complaints at this time.    Objective   No Known Allergies    Vital Signs:   Pulse 88   Ht 170.2 cm (67\")   Wt 83.3 kg (183 lb 9.6 oz)   SpO2 98%   BMI 28.76 kg/m²       Physical Exam  Constitutional:       Appearance: Normal appearance. Patient is well-developed and normal weight.   HENT:      Head: Normocephalic.      Right Ear: Hearing and external ear normal.      Left Ear: Hearing and external ear normal.      Nose: Nose normal.   Eyes:      Conjunctiva/sclera: Conjunctivae normal.   Cardiovascular:      Rate and Rhythm: Normal rate.   Pulmonary:      Effort: Pulmonary effort is normal.      Breath sounds: No wheezing or rales.   Abdominal:      Palpations: Abdomen is soft.      Tenderness: There is no abdominal tenderness.   Musculoskeletal:      Cervical back: Normal range of motion.   Skin:     Findings: No rash.   Neurological:      Mental Status: Patient is alert and oriented to person, place, and time.   Psychiatric:         Mood and Affect: Mood and affect normal.         Judgment: Judgment normal.     Ortho Exam  Left knee: Tenderness of the medial lateral joint lines.  Scars well-healed.  Minimal swelling.  Full flexion extension of the knee.  Stable to varus and valgus stress.  Full plantar flexion and dorsiflexion of the ankle.  Good muscle tone of the quadriceps, hamstrings and ankle flexors.  Fully weightbearing, nonantalgic gait.  Sensation intact.  Neurovascular intact.  Posterior tibialis pulse 2+.    Result Review :            Imaging Results (Most Recent)     None       "   LEFT KNEE: L knee  Date/Time: 1/24/2022 1:58 PM  Consent given by: patient  Site marked: site marked  Timeout: Immediately prior to procedure a time out was called to verify the correct patient, procedure, equipment, support staff and site/side marked as required   Supporting Documentation  Indications: pain   Procedure Details  Location: knee - L knee  Needle size: 22 G  Medications administered: 48 mg hylan 48 MG/6ML  Patient tolerance: patient tolerated the procedure well with no immediate complications            Assessment and Plan    Problem List Items Addressed This Visit        Musculoskeletal and Injuries    Primary osteoarthritis of left knee - Primary          Follow Up   Return in about 1 week (around 1/31/2022).  Patient Instructions   Left knee Synvisc injection given today. Recommend rest, ice and elevation for any swelling following today's injection. Follow up for right knee synvisc in 1+week.    Patient was given instructions and counseling regarding his condition or for health maintenance advice. Please see specific information pulled into the AVS if appropriate.

## 2022-01-24 NOTE — PATIENT INSTRUCTIONS
Left knee Synvisc injection given today. Recommend rest, ice and elevation for any swelling following today's injection. Follow up for right knee synvisc in 1+week.

## 2022-02-21 ENCOUNTER — OFFICE VISIT (OUTPATIENT)
Dept: ORTHOPEDIC SURGERY | Facility: CLINIC | Age: 47
End: 2022-02-21

## 2022-02-21 VITALS — BODY MASS INDEX: 28.56 KG/M2 | HEIGHT: 67 IN | WEIGHT: 182 LBS

## 2022-02-21 DIAGNOSIS — M17.11 PRIMARY OSTEOARTHRITIS OF RIGHT KNEE: Primary | ICD-10-CM

## 2022-02-21 PROCEDURE — 20610 DRAIN/INJ JOINT/BURSA W/O US: CPT | Performed by: PHYSICIAN ASSISTANT

## 2022-02-21 NOTE — PATIENT INSTRUCTIONS
Right knee Synvisc injection given today.     Follow up as needed.   Call with any questions or concerns.

## 2022-02-21 NOTE — PROGRESS NOTES
"Chief Complaint  Pain of the Right Knee    Marissa Foley presents to [facility] for follow up of right knee pain, right knee osteoarthritis. Patient presents today to receive right knee Synvisc injection. He last received right knee Synvisc in August of 2021. He received left knee Synvisc on 01/24/22. He has no other new complaints at this time.     Objective   No Known Allergies    Vital Signs:   Ht 170.2 cm (67\")   Wt 82.6 kg (182 lb)   BMI 28.51 kg/m²       Physical Exam  Constitutional:       Appearance: Normal appearance. Patient is well-developed and normal weight.   HENT:      Head: Normocephalic.      Right Ear: Hearing and external ear normal.      Left Ear: Hearing and external ear normal.      Nose: Nose normal.   Eyes:      Conjunctiva/sclera: Conjunctivae normal.   Cardiovascular:      Rate and Rhythm: Normal rate.   Pulmonary:      Effort: Pulmonary effort is normal.      Breath sounds: No wheezing or rales.   Abdominal:      Palpations: Abdomen is soft.      Tenderness: There is no abdominal tenderness.   Musculoskeletal:      Cervical back: Normal range of motion.   Skin:     Findings: No rash.   Neurological:      Mental Status: Patient is alert and oriented to person, place, and time.   Psychiatric:         Mood and Affect: Mood and affect normal.         Judgment: Judgment normal.     Ortho Exam  RIGHT KNEE: tender joint lines, mild swelling, no discoloration. Full knee extension. Full knee flexion to 130 degrees. Stable to varus and valgus stress. Sensation intact. Neurovascular intact. Posterior tibialis pulse 2+.       Result Review :            Imaging Results (Most Recent)     None         Large Joint Arthrocentesis: R knee  Date/Time: 2/21/2022 2:02 PM  Consent given by: patient  Site marked: site marked  Timeout: Immediately prior to procedure a time out was called to verify the correct patient, procedure, equipment, support staff and " site/side marked as required   Supporting Documentation  Indications: pain   Procedure Details  Location: knee - R knee  Needle gauge: 21 G.  Medications administered: 48 mg hylan 48 MG/6ML  Patient tolerance: patient tolerated the procedure well with no immediate complications            Assessment and Plan    Problem List Items Addressed This Visit        Musculoskeletal and Injuries    Primary osteoarthritis of right knee - Primary          Follow Up   Return if symptoms worsen or fail to improve.  Patient Instructions   Right knee Synvisc injection given today.     Follow up as needed.   Call with any questions or concerns.    Patient was given instructions and counseling regarding his condition or for health maintenance advice. Please see specific information pulled into the AVS if appropriate.

## 2022-05-24 ENCOUNTER — TELEPHONE (OUTPATIENT)
Dept: ORTHOPEDIC SURGERY | Facility: CLINIC | Age: 47
End: 2022-05-24

## 2022-05-24 NOTE — TELEPHONE ENCOUNTER
Caller: PATIENT     Relationship to patient: SELF     Best call back number: [phone]    Chief complaint:  BILATERAL KNEE PAIN     Type of visit: GEL INJECTIONS     Requested date: June       Additional notes: PT. WANTS TO SCHEDULE GEL INJECTIONS FOR HIS KNEES.   PLEASE CALL TO ADVISE.

## 2022-12-19 ENCOUNTER — TELEPHONE (OUTPATIENT)
Dept: GASTROENTEROLOGY | Facility: CLINIC | Age: 47
End: 2022-12-19

## 2022-12-19 ENCOUNTER — PREP FOR SURGERY (OUTPATIENT)
Dept: OTHER | Facility: HOSPITAL | Age: 47
End: 2022-12-19

## 2022-12-19 ENCOUNTER — CLINICAL SUPPORT (OUTPATIENT)
Dept: GASTROENTEROLOGY | Facility: CLINIC | Age: 47
End: 2022-12-19

## 2022-12-19 DIAGNOSIS — Z12.11 COLON CANCER SCREENING: Primary | ICD-10-CM

## 2022-12-19 NOTE — TELEPHONE ENCOUNTER
Sebastian Foley  REASON FOR CALL COLON SCREENING   SENT IN PREP 4L  PROCEDURE DATE: 3/31/23  NO PULMONOLOGIST / NO CARDIOLOGIST / NO BLOOD THINNER PER PT      Past Medical History:   Diagnosis Date   • Arthritis    • Benign essential hypertension    • Chronic migraine without aura 10/04/2019    He has chronic migraine headaches.  I will start him on Aimovig injection 70 mg/ml.  I gave him literature reguarding the adverse effects.  He was given a sample in this office.  I discussed with him that he is not a candidate for topiramate because of history of kidney stones.  He should not be taking Depakote since it will i nterfere with lamotrigine.     • HTN (hypertension)    • Hyperlipidemia    • Leg pain    • Limb swelling    • Migraines    • Positive PPD    • Precordial chest pain 2017   • Renal calculus    • Ureterolithiasis 2017    right sided     No Known Allergies  Past Surgical History:   Procedure Laterality Date   • CYSTOSCOPY     • KIDNEY STONE SURGERY      Unspecified   • REPLACEMENT TOTAL KNEE      Knee surgery     Social History     Socioeconomic History   • Marital status:    Tobacco Use   • Smoking status: Former     Packs/day: 0.25     Years: 10.00     Pack years: 2.50     Types: Cigarettes, Cigars     Start date: 2000     Quit date: 2010     Years since quittin.3   • Smokeless tobacco: Never   Vaping Use   • Vaping Use: Never used   Substance and Sexual Activity   • Alcohol use: Yes     Comment: Current some days   • Drug use: Never   • Sexual activity: Yes     Partners: Female     Birth control/protection: Vasectomy     Family History   Problem Relation Age of Onset   • Heart disease Mother    • Diabetes Father         Mellitus   • Hypertension Father    • Arthritis Father    • Heart disease Father        Current Outpatient Medications:   •  cloNIDine (CATAPRES) 0.1 MG tablet, , Disp: , Rfl:   •  cloNIDine (CATAPRES) 0.1 MG tablet, Catapres 0.1 mg oral tablet take 1  tablet (0.1 mg) by oral route 2 times per day   Suspended, Disp: , Rfl:   •  Erenumab-aooe (Aimovig) 140 MG/ML prefilled syringe, Aimovig Autoinjector 140 mg/mL subcutaneous auto-injector inject 1 milliliter (140 mg) by subcutaneous route once a month 2/25/2021  Active, Disp: , Rfl:   •  eszopiclone (LUNESTA) 3 MG tablet, , Disp: , Rfl:   •  hydrOXYzine (ATARAX) 25 MG tablet, hydroxyzine HCl 25 mg oral tablet take 2 tablets by oral route once a day (at bedtime)   Suspended, Disp: , Rfl:   •  lamoTRIgine (LaMICtal) 100 MG tablet, Lamictal 100 mg oral tablet take 1.5 tablets by oral route daily   Suspended, Disp: , Rfl:   •  lamoTRIgine (LaMICtal) 200 MG tablet, , Disp: , Rfl:   •  Linoleic Acid Conjugated 1000 MG capsule, CLA 1,000 mg oral capsule take 1 capsule by oral route daily   Suspended, Disp: , Rfl:   •  Lurasidone HCl (LATUDA) 20 MG tablet tablet, , Disp: , Rfl:   •  naproxen (NAPROSYN) 500 MG tablet, naproxen 500 mg oral tablet take 1 tablet by oral route 2 times a day as needed   Suspended, Disp: , Rfl:   •  oxyCODONE-acetaminophen (PERCOCET) 5-325 MG per tablet, Percocet 5-325 mg oral tablet take 1 tablet by oral route every 4-6 hours as needed   Suspended, Disp: , Rfl:   •  prazosin (MINIPRESS) 2 MG capsule, , Disp: , Rfl:   •  sertraline (ZOLOFT) 100 MG tablet, , Disp: , Rfl:   •  sildenafil (VIAGRA) 100 MG tablet, , Disp: , Rfl:   •  SUMAtriptan (IMITREX) 20 MG/ACT nasal spray, sumatriptan 20 mg/actuation nasal spray,non-aerosol 50 mg as directed   Active, Disp: , Rfl:   •  SUMAtriptan (IMITREX) 50 MG tablet, Imitrex 50 mg oral tablet take 1 tablet (50 mg) by oral route once with fluids as early as possible after the onset of a migraine attack;may repeat after 2 hours if headache returns, not to exceed 200mg in 24hrs   Suspended, Disp: , Rfl:   •  venlafaxine XR (EFFEXOR-XR) 150 MG 24 hr capsule, , Disp: , Rfl:

## 2022-12-20 PROBLEM — Z12.11 COLON CANCER SCREENING: Status: ACTIVE | Noted: 2022-12-20

## 2023-03-29 ENCOUNTER — TELEPHONE (OUTPATIENT)
Dept: GASTROENTEROLOGY | Facility: CLINIC | Age: 48
End: 2023-03-29
Payer: OTHER GOVERNMENT

## 2023-03-29 NOTE — TELEPHONE ENCOUNTER
"Patient called in to answering service and left a message stating \"HAVE PROCEDURE ON 3/31 HAVE PREP QUESTIONS\"  "

## 2023-03-31 ENCOUNTER — HOSPITAL ENCOUNTER (OUTPATIENT)
Facility: HOSPITAL | Age: 48
Setting detail: HOSPITAL OUTPATIENT SURGERY
Discharge: HOME | End: 2023-03-31
Attending: INTERNAL MEDICINE | Admitting: INTERNAL MEDICINE
Payer: OTHER GOVERNMENT

## 2023-03-31 ENCOUNTER — ANESTHESIA (OUTPATIENT)
Dept: GASTROENTEROLOGY | Facility: HOSPITAL | Age: 48
End: 2023-03-31
Payer: OTHER GOVERNMENT

## 2023-03-31 ENCOUNTER — ANESTHESIA EVENT (OUTPATIENT)
Dept: GASTROENTEROLOGY | Facility: HOSPITAL | Age: 48
End: 2023-03-31
Payer: OTHER GOVERNMENT

## 2023-03-31 VITALS
RESPIRATION RATE: 18 BRPM | OXYGEN SATURATION: 98 % | HEART RATE: 59 BPM | TEMPERATURE: 98.1 F | BODY MASS INDEX: 27.14 KG/M2 | SYSTOLIC BLOOD PRESSURE: 119 MMHG | WEIGHT: 173.28 LBS | DIASTOLIC BLOOD PRESSURE: 89 MMHG

## 2023-03-31 PROCEDURE — 45378 DIAGNOSTIC COLONOSCOPY: CPT | Performed by: INTERNAL MEDICINE

## 2023-03-31 PROCEDURE — 25010000002 PROPOFOL 10 MG/ML EMULSION: Performed by: NURSE ANESTHETIST, CERTIFIED REGISTERED

## 2023-03-31 RX ORDER — LIDOCAINE HYDROCHLORIDE 20 MG/ML
INJECTION, SOLUTION EPIDURAL; INFILTRATION; INTRACAUDAL; PERINEURAL AS NEEDED
Status: DISCONTINUED | OUTPATIENT
Start: 2023-03-31 | End: 2023-03-31 | Stop reason: SURG

## 2023-03-31 RX ORDER — PROPOFOL 10 MG/ML
VIAL (ML) INTRAVENOUS AS NEEDED
Status: DISCONTINUED | OUTPATIENT
Start: 2023-03-31 | End: 2023-03-31 | Stop reason: SURG

## 2023-03-31 RX ORDER — SODIUM CHLORIDE, SODIUM LACTATE, POTASSIUM CHLORIDE, CALCIUM CHLORIDE 600; 310; 30; 20 MG/100ML; MG/100ML; MG/100ML; MG/100ML
30 INJECTION, SOLUTION INTRAVENOUS CONTINUOUS
Status: DISCONTINUED | OUTPATIENT
Start: 2023-03-31 | End: 2023-03-31 | Stop reason: HOSPADM

## 2023-03-31 RX ADMIN — SODIUM CHLORIDE, POTASSIUM CHLORIDE, SODIUM LACTATE AND CALCIUM CHLORIDE 30 ML/HR: 600; 310; 30; 20 INJECTION, SOLUTION INTRAVENOUS at 07:36

## 2023-03-31 RX ADMIN — LIDOCAINE HYDROCHLORIDE 100 MG: 20 INJECTION, SOLUTION EPIDURAL; INFILTRATION; INTRACAUDAL; PERINEURAL at 09:19

## 2023-03-31 RX ADMIN — PROPOFOL 50 MG: 10 INJECTION, EMULSION INTRAVENOUS at 09:26

## 2023-03-31 RX ADMIN — PROPOFOL 200 MCG/KG/MIN: 10 INJECTION, EMULSION INTRAVENOUS at 09:19

## 2023-03-31 RX ADMIN — PROPOFOL 200 MG: 10 INJECTION, EMULSION INTRAVENOUS at 09:20

## 2023-03-31 NOTE — H&P
Pre Procedure History & Physical    Chief Complaint:   Screening    Subjective     HPI:   Colon cancer screening    Past Medical History:   Past Medical History:   Diagnosis Date   • Arthritis    • Benign essential hypertension    • Chronic migraine without aura 10/04/2019    He has chronic migraine headaches.  I will start him on Aimovig injection 70 mg/ml.  I gave him literature reguarding the adverse effects.  He was given a sample in this office.  I discussed with him that he is not a candidate for topiramate because of history of kidney stones.  He should not be taking Depakote since it will i nterfere with lamotrigine.     • HTN (hypertension)    • Hyperlipidemia    • Leg pain    • Limb swelling    • Migraines    • Positive PPD    • Precordial chest pain 08/11/2017   • Renal calculus    • Ureterolithiasis 06/07/2017    right sided       Past Surgical History:  Past Surgical History:   Procedure Laterality Date   • CYSTOSCOPY     • KIDNEY STONE SURGERY      Unspecified   • KNEE ARTHROSCOPY Left        Family History:  Family History   Problem Relation Age of Onset   • Heart disease Mother    • Diabetes Father         Mellitus   • Hypertension Father    • Arthritis Father    • Heart disease Father    • Malig Hyperthermia Neg Hx        Social History:   reports that he quit smoking about 12 years ago. His smoking use included cigarettes and cigars. He started smoking about 23 years ago. He has a 2.50 pack-year smoking history. He has never used smokeless tobacco. He reports current alcohol use. He reports that he does not use drugs.    Medications:   Medications Prior to Admission   Medication Sig Dispense Refill Last Dose   • cloNIDine (CATAPRES) 0.1 MG tablet Catapres 0.1 mg oral tablet take 1 tablet (0.1 mg) by oral route 2 times per day   Suspended   Past Week   • Erenumab-aooe (Aimovig) 140 MG/ML prefilled syringe Aimovig Autoinjector 140 mg/mL subcutaneous auto-injector inject 1 milliliter (140 mg) by  subcutaneous route once a month 2/25/2021  Active   Past Week   • eszopiclone (LUNESTA) 3 MG tablet Take 1 tablet by mouth Every Night.   Past Week   • hydrOXYzine (ATARAX) 25 MG tablet hydroxyzine HCl 25 mg oral tablet take 2 tablets by oral route once a day (at bedtime)   Suspended   Past Week   • lamoTRIgine (LaMICtal) 100 MG tablet Lamictal 100 mg oral tablet take 1.5 tablets by oral route daily   Suspended   Past Week   • Lurasidone HCl (LATUDA) 20 MG tablet tablet Take 1 tablet by mouth Daily.   Past Week   • naproxen (NAPROSYN) 500 MG tablet naproxen 500 mg oral tablet take 1 tablet by oral route 2 times a day as needed   Suspended   Past Week   • polyethylene glycol (GoLYTELY) 236 g solution TAKE AS DIRECTED BY YOUR PROVIDER 4000 mL 0 3/31/2023   • prazosin (MINIPRESS) 2 MG capsule Take 1 capsule by mouth Every Night.   Past Week   • venlafaxine XR (EFFEXOR-XR) 150 MG 24 hr capsule Take 1 capsule by mouth Daily.   Past Week   • cloNIDine (CATAPRES) 0.1 MG tablet       • lamoTRIgine (LaMICtal) 200 MG tablet       • Linoleic Acid Conjugated 1000 MG capsule CLA 1,000 mg oral capsule take 1 capsule by oral route daily   Suspended      • oxyCODONE-acetaminophen (PERCOCET) 5-325 MG per tablet Percocet 5-325 mg oral tablet take 1 tablet by oral route every 4-6 hours as needed   Suspended      • sertraline (ZOLOFT) 100 MG tablet       • sildenafil (VIAGRA) 100 MG tablet    Unknown   • SUMAtriptan (IMITREX) 20 MG/ACT nasal spray sumatriptan 20 mg/actuation nasal spray,non-aerosol 50 mg as directed   Active   Unknown   • SUMAtriptan (IMITREX) 50 MG tablet Imitrex 50 mg oral tablet take 1 tablet (50 mg) by oral route once with fluids as early as possible after the onset of a migraine attack;may repeat after 2 hours if headache returns, not to exceed 200mg in 24hrs   Suspended   Unknown       Allergies:  Patient has no known allergies.        Objective     Blood pressure 138/93, pulse 59, temperature 97 °F (36.1 °C),  temperature source Temporal, resp. rate 18, weight 78.6 kg (173 lb 4.5 oz), SpO2 94 %.    Physical Exam   Constitutional: Pt is oriented to person, place, and time and well-developed, well-nourished, and in no distress.   Mouth/Throat: Oropharynx is clear and moist.   Neck: Normal range of motion.   Cardiovascular: Normal rate, regular rhythm and normal heart sounds.    Pulmonary/Chest: Effort normal and breath sounds normal.   Abdominal: Soft. Nontender  Skin: Skin is warm and dry.   Psychiatric: Mood, memory, affect and judgment normal.     Assessment & Plan     Diagnosis:  Screening for colon cancer    Anticipated Surgical Procedure:  Colonoscopy    The risks, benefits, and alternatives of this procedure have been discussed with the patient or the responsible party- the patient understands and agrees to proceed.

## 2023-03-31 NOTE — ANESTHESIA PREPROCEDURE EVALUATION
Anesthesia Evaluation     Patient summary reviewed and Nursing notes reviewed   no history of anesthetic complications:  NPO Solid Status: > 8 hours  NPO Liquid Status: > 2 hours           Airway   Mallampati: II  TM distance: >3 FB  Neck ROM: full  No difficulty expected  Dental      Pulmonary - negative pulmonary ROS and normal exam    breath sounds clear to auscultation  Cardiovascular - normal exam  Exercise tolerance: good (4-7 METS)    Rhythm: regular  Rate: normal    (+) hypertension, hyperlipidemia,       Neuro/Psych  (+) headaches,    GI/Hepatic/Renal/Endo    (+)   renal disease stones,     Musculoskeletal     Abdominal    Substance History - negative use     OB/GYN negative ob/gyn ROS         Other   arthritis,      ROS/Med Hx Other: PAT Nursing Notes unavailable.                   Anesthesia Plan    ASA 2     general     (Total IV Anesthesia    Patient understands anesthesia not responsible for dental damage.  )  intravenous induction     Anesthetic plan, risks, benefits, and alternatives have been provided, discussed and informed consent has been obtained with: patient.  Pre-procedure education provided  Plan discussed with CRNA.        CODE STATUS:

## 2023-03-31 NOTE — ANESTHESIA POSTPROCEDURE EVALUATION
Patient: Sebastian Foley    Procedure Summary     Date: 03/31/23 Room / Location: [facility] ENDOSCOPY 4 / [facility] ENDOSCOPY    Anesthesia Start: 0919 Anesthesia Stop: 0949    Procedure: COLONOSCOPY FOR SCREENING Diagnosis:       Colon cancer screening      (Colon cancer screening [Z12.11])    Surgeons: Jesu Chaidez MD Provider: Simone Garcia MD    Anesthesia Type: general ASA Status: 2          Anesthesia Type: general    Vitals  Vitals Value Taken Time   /89 03/31/23 1023   Temp 36.7 °C (98.1 °F) 03/31/23 0953   Pulse 56 03/31/23 1024   Resp 18 03/31/23 1023   SpO2 98 % 03/31/23 1024   Vitals shown include unvalidated device data.        Post Anesthesia Care and Evaluation    Patient location during evaluation: bedside  Patient participation: complete - patient participated  Level of consciousness: awake and alert  Pain management: adequate    Airway patency: patent  Anesthetic complications: No anesthetic complications  PONV Status: none  Cardiovascular status: acceptable  Respiratory status: acceptable  Hydration status: acceptable    Comments: An Anesthesiologist personally participated in the most demanding procedures (including induction and emergence if applicable) in the anesthesia plan, monitored the course of anesthesia administration at frequent intervals and remained physically present and available for immediate diagnosis and treatment of emergencies.

## 2023-04-06 ENCOUNTER — TELEPHONE (OUTPATIENT)
Dept: GASTROENTEROLOGY | Facility: CLINIC | Age: 48
End: 2023-04-06
Payer: OTHER GOVERNMENT

## 2023-04-06 NOTE — TELEPHONE ENCOUNTER
I have reviewed the patients colonoscopy report. The report showed a normal colonoscopy.  No polyps removed or specimens collected.  Repeat colonoscopy in 10 years. Please place in recall. Send letter.       If patient has family history of colon cancer or this status changes he would need to be changed to a 5 year recall.

## (undated) DEVICE — Device

## (undated) DEVICE — SOLIDIFIER LIQLOC PLS 1500CC BT

## (undated) DEVICE — Device: Brand: DEFENDO AIR/WATER/SUCTION AND BIOPSY VALVE

## (undated) DEVICE — SOL IRRG H2O PL/BG 1000ML STRL